# Patient Record
Sex: FEMALE | Race: WHITE | NOT HISPANIC OR LATINO | Employment: UNEMPLOYED | URBAN - METROPOLITAN AREA
[De-identification: names, ages, dates, MRNs, and addresses within clinical notes are randomized per-mention and may not be internally consistent; named-entity substitution may affect disease eponyms.]

---

## 2023-11-24 ENCOUNTER — HOSPITAL ENCOUNTER (OUTPATIENT)
Dept: RADIOLOGY | Facility: HOSPITAL | Age: 53
Discharge: HOME | End: 2023-11-24
Payer: OTHER GOVERNMENT

## 2023-11-24 ENCOUNTER — OFFICE VISIT (OUTPATIENT)
Dept: PRIMARY CARE | Facility: CLINIC | Age: 53
End: 2023-11-24
Payer: OTHER GOVERNMENT

## 2023-11-24 DIAGNOSIS — R11.2 NAUSEA AND VOMITING, UNSPECIFIED VOMITING TYPE: ICD-10-CM

## 2023-11-24 DIAGNOSIS — K59.00 CONSTIPATION, UNSPECIFIED CONSTIPATION TYPE: ICD-10-CM

## 2023-11-24 DIAGNOSIS — R14.0 ABDOMINAL BLOATING: Primary | ICD-10-CM

## 2023-11-24 DIAGNOSIS — R14.0 ABDOMINAL BLOATING: ICD-10-CM

## 2023-11-24 PROBLEM — F40.243 FEAR OF FLYING: Status: ACTIVE | Noted: 2023-11-24

## 2023-11-24 PROBLEM — N92.0 MENORRHAGIA WITH REGULAR CYCLE: Status: RESOLVED | Noted: 2023-11-24 | Resolved: 2023-11-24

## 2023-11-24 PROBLEM — E78.5 HYPERLIPIDEMIA: Status: ACTIVE | Noted: 2023-11-24

## 2023-11-24 PROBLEM — N20.0 KIDNEY STONE: Status: RESOLVED | Noted: 2023-11-24 | Resolved: 2023-11-24

## 2023-11-24 PROBLEM — F41.9 ANXIETY: Status: ACTIVE | Noted: 2023-11-24

## 2023-11-24 PROBLEM — E55.9 VITAMIN D DEFICIENCY: Status: ACTIVE | Noted: 2023-11-24

## 2023-11-24 PROBLEM — E66.9 OBESITY WITH BODY MASS INDEX 30 OR GREATER: Status: RESOLVED | Noted: 2023-11-24 | Resolved: 2023-11-24

## 2023-11-24 PROCEDURE — 99213 OFFICE O/P EST LOW 20 MIN: CPT | Mod: 25 | Performed by: NURSE PRACTITIONER

## 2023-11-24 PROCEDURE — 99213 OFFICE O/P EST LOW 20 MIN: CPT | Performed by: NURSE PRACTITIONER

## 2023-11-24 PROCEDURE — 1036F TOBACCO NON-USER: CPT | Performed by: NURSE PRACTITIONER

## 2023-11-24 PROCEDURE — 76700 US EXAM ABDOM COMPLETE: CPT

## 2023-11-24 RX ORDER — MEDROXYPROGESTERONE ACETATE 5 MG/1
5 TABLET ORAL EVERY 24 HOURS
COMMUNITY

## 2023-11-24 RX ORDER — ESTRADIOL 1 MG/1
1 TABLET ORAL DAILY
COMMUNITY
Start: 2023-10-21

## 2023-11-24 RX ORDER — SERTRALINE HYDROCHLORIDE 50 MG/1
50 TABLET, FILM COATED ORAL DAILY
COMMUNITY
Start: 2019-04-11 | End: 2024-04-10

## 2023-11-24 RX ORDER — ERGOCALCIFEROL 1.25 MG/1
50000 CAPSULE ORAL
COMMUNITY
End: 2023-12-11

## 2023-11-24 RX ORDER — ATORVASTATIN CALCIUM 40 MG/1
40 TABLET, FILM COATED ORAL DAILY
COMMUNITY
Start: 2023-10-20 | End: 2024-04-18

## 2023-11-24 ASSESSMENT — PAIN SCALES - GENERAL: PAINLEVEL: 0-NO PAIN

## 2023-11-24 ASSESSMENT — PATIENT HEALTH QUESTIONNAIRE - PHQ9
1. LITTLE INTEREST OR PLEASURE IN DOING THINGS: NOT AT ALL
2. FEELING DOWN, DEPRESSED OR HOPELESS: NOT AT ALL
SUM OF ALL RESPONSES TO PHQ9 QUESTIONS 1 AND 2: 0

## 2023-11-24 NOTE — PROGRESS NOTES
Subjective   Patient ID: Lexi Salazar is a 53 y.o. female who presents for Abdominal Pain (Patient here for stomach pain).  Bloating and nausea since yesterday had mild conspiation, and nauseated and vomiting, pepto no relief no urinary symptoms in mepause   HPI     Review of Systems   Constitutional:  Positive for fatigue.   HENT: Negative.     Respiratory: Negative.     Cardiovascular: Negative.    Gastrointestinal:  Positive for abdominal distention, abdominal pain and constipation.   Genitourinary: Negative.    Musculoskeletal: Negative.    Neurological: Negative.    All other systems reviewed and are negative.      Objective   There were no vitals taken for this visit.    Physical Exam  Vitals reviewed.   Constitutional:       General: She is not in acute distress.     Appearance: Normal appearance.   Cardiovascular:      Rate and Rhythm: Normal rate and regular rhythm.      Heart sounds: No murmur heard.  Pulmonary:      Breath sounds: Normal breath sounds. No wheezing.   Abdominal:      General: Bowel sounds are normal. There is distension.      Palpations: Abdomen is soft.   Neurological:      Mental Status: She is alert.         Assessment/Plan   Problem List Items Addressed This Visit    None  Visit Diagnoses         Codes    Abdominal bloating    -  Primary R14.0    Relevant Orders    US abdomen (Completed)    Nausea and vomiting, unspecified vomiting type     R11.2    Relevant Orders    US abdomen (Completed)    Constipation, unspecified constipation type     K59.00    Relevant Orders    US abdomen (Completed)

## 2023-11-26 ASSESSMENT — ENCOUNTER SYMPTOMS
ABDOMINAL PAIN: 1
CARDIOVASCULAR NEGATIVE: 1
CONSTIPATION: 1
RESPIRATORY NEGATIVE: 1
MUSCULOSKELETAL NEGATIVE: 1
FATIGUE: 1
ABDOMINAL DISTENTION: 1
NEUROLOGICAL NEGATIVE: 1

## 2023-12-10 DIAGNOSIS — E55.9 VITAMIN D DEFICIENCY: ICD-10-CM

## 2023-12-11 RX ORDER — ERGOCALCIFEROL 1.25 MG/1
50000 CAPSULE ORAL
Qty: 12 CAPSULE | Refills: 3 | Status: SHIPPED | OUTPATIENT
Start: 2023-12-11 | End: 2024-11-11

## 2024-03-07 ENCOUNTER — OFFICE VISIT (OUTPATIENT)
Dept: ORTHOPEDIC SURGERY | Facility: CLINIC | Age: 54
End: 2024-03-07
Payer: OTHER GOVERNMENT

## 2024-03-07 DIAGNOSIS — M65.30 TRIGGER FINGER, ACQUIRED: Primary | ICD-10-CM

## 2024-03-07 PROCEDURE — 1036F TOBACCO NON-USER: CPT | Performed by: ORTHOPAEDIC SURGERY

## 2024-03-07 PROCEDURE — 99214 OFFICE O/P EST MOD 30 MIN: CPT | Performed by: ORTHOPAEDIC SURGERY

## 2024-03-07 ASSESSMENT — ENCOUNTER SYMPTOMS
LOSS OF SENSATION IN FEET: 0
DEPRESSION: 0
OCCASIONAL FEELINGS OF UNSTEADINESS: 0

## 2024-03-07 ASSESSMENT — PATIENT HEALTH QUESTIONNAIRE - PHQ9
SUM OF ALL RESPONSES TO PHQ9 QUESTIONS 1 AND 2: 0
1. LITTLE INTEREST OR PLEASURE IN DOING THINGS: NOT AT ALL
2. FEELING DOWN, DEPRESSED OR HOPELESS: NOT AT ALL

## 2024-03-07 ASSESSMENT — PAIN - FUNCTIONAL ASSESSMENT: PAIN_FUNCTIONAL_ASSESSMENT: NO/DENIES PAIN

## 2024-03-07 NOTE — PROGRESS NOTES
History of Present Illness:  Chief complaint: Right middle and ring finger trigger fingers      Patient presents today for evaluation of right long finger, ring finger pain. Endorses catching of the digit with flexion. Denies any traumatic event or injury. The patient notes that it is worse with periods of disuse and in the morning.  Somewhat better with rest.  The patient endorses sharp focal pain when it catches.  The patient denies any numbness and tingling.  Symptoms were improved for about 6 months after corticosteroid injections.  Over the past month increasing pain.  No issues on the left side currently.    Past Medical History:   Diagnosis Date    Calculus of kidney     Bilateral kidney stones    Carpal tunnel syndrome, bilateral upper limbs 2016    Severe carpal tunnel syndrome of both wrists    Carpal tunnel syndrome, left upper limb 2016    Carpal tunnel syndrome of left wrist    Carpal tunnel syndrome, left upper limb 2016    Carpal tunnel syndrome of left wrist    Encounter for full-term uncomplicated delivery      (spontaneous vaginal delivery)    Kidney stone 2023    Menorrhagia with regular cycle 2023    Obesity with body mass index 30 or greater 2023    Personal history of other endocrine, nutritional and metabolic disease 08/10/2015    History of obesity    Personal history of other mental and behavioral disorders 2015    History of depression    Personal history of other specified conditions 2017    History of motion sickness    Personal history of other specified conditions 2016    History of palpitations    Varicella without complication     Varicella without complication       Medication Documentation Review Audit       Reviewed by Bismark Gonzalez MD (Physician) on 24 at 1352      Medication Order Taking? Sig Documenting Provider Last Dose Status   atorvastatin (Lipitor) 40 mg tablet [id number] No Take 1 tablet (40 mg) by mouth  once daily. Historical Provider, MD Taking Active   ergocalciferol (Vitamin D2) 1.25 MG (85083 UT) capsule [id number]  Take 1 capsule (50,000 Units) by mouth 1 (one) time per week. Griselda Curry, APRN-CNP  Active   estradiol (Estrace) 1 mg tablet [id number] No Take 1 tablet (1 mg) by mouth once daily. Historical Provider, MD Taking Active   medroxyPROGESTERone (Provera) 5 mg tablet [id number] No Take 1 tablet (5 mg) by mouth once every 24 hours. Historical Provider, MD Taking Active   Zoloft 50 mg tablet [id number] No Take 1 tablet (50 mg) by mouth once daily. Historical Provider, MD Taking Active                    No Known Allergies    Social History     Socioeconomic History    Marital status:      Spouse name: Not on file    Number of children: Not on file    Years of education: Not on file    Highest education level: Not on file   Occupational History    Not on file   Tobacco Use    Smoking status: Never    Smokeless tobacco: Never   Substance and Sexual Activity    Alcohol use: Not Currently    Drug use: Never    Sexual activity: Not on file   Other Topics Concern    Not on file   Social History Narrative    Not on file     Social Determinants of Health     Financial Resource Strain: Not on file   Food Insecurity: Not on file   Transportation Needs: Not on file   Physical Activity: Not on file   Stress: Not on file   Social Connections: Not on file   Intimate Partner Violence: Not on file   Housing Stability: Not on file       Past Surgical History:   Procedure Laterality Date    MOUTH SURGERY  11/16/2017    Oral Surgery Tooth Extraction    OTHER SURGICAL HISTORY  11/16/2017    Cardiac Catheter His Ablation          Review of Systems   GENERAL: Negative for malaise, significant weight loss, fever  MUSCULOSKELETAL: see HPI  NEURO:  Negative     Physical Examination  Constitutional: Appears well-developed and well-nourished.  Head: Normocephalic and atraumatic.  Eyes: EOMI grossly  Cardiovascular: Intact  distal pulses.   Respiratory: Effort normal. No respiratory distress.  Neurologic: Alert and oriented to person, place, and time.  Skin: Skin is warm and dry.  Hematologic / Lymphatic: No lymphedema, lymphangitis.  Psychiatric: normal mood and affect. Behavior is normal.   Musculoskeletal:  right hand  No obvious atrophy, no skin changes  Tenderness, palpable nodule along the long finger, ring finger flexor tendon sheath  Palpable catching/crepitus with active flexion and extension   No subluxation of the extensor tendon appreciated over the MP/PIP joints.  Sensation intact distally.  Capillary refill less than 2 seconds distally.     Assessment:  Patient with right long finger, ring finger trigger finger.  Failure of previous corticosteroid injection.     Plan:  Nature of the diagnosis was discussed with the patient.  We also discussed the risks and benefits of treatment options for trigger finger.  These include splinting, corticosteroid injections and, if conservative options fail, surgical release.  As patient has failed previous corticosteroid injection she would like to proceed with surgical intervention.    Risks and benefits of continued nonoperative versus operative treatment options were reviewed.  The surgical benefits and the potential complications were reviewed with the patient today, as well as the day surgical setting and the likely postoperative course.  Patient understands that the goal of surgery is potential relief of some symptoms.  Risks discussed included, but were not limited to, residual/recurrent/worsening symptoms, pain, infection, bleeding, stiffness, injury to nerve/blood vessels/tendons, wound healing issues and possible need for reoperation.  I answered the patient's questions and surgical consent reviewed and obtained.  The patient has elected to proceed with surgery and we will schedule it at the patient's convenience.    The patient will followup with us in the operating room and  then postoperatively.

## 2024-03-08 PROBLEM — M65.30 TRIGGER FINGER, ACQUIRED: Status: ACTIVE | Noted: 2024-03-07

## 2024-03-11 ENCOUNTER — HOSPITAL ENCOUNTER (OUTPATIENT)
Dept: RADIOLOGY | Facility: CLINIC | Age: 54
Discharge: HOME | End: 2024-03-11
Payer: OTHER GOVERNMENT

## 2024-03-11 ENCOUNTER — OFFICE VISIT (OUTPATIENT)
Dept: ORTHOPEDIC SURGERY | Facility: CLINIC | Age: 54
End: 2024-03-11
Payer: OTHER GOVERNMENT

## 2024-03-11 DIAGNOSIS — M25.561 ACUTE PAIN OF RIGHT KNEE: ICD-10-CM

## 2024-03-11 PROCEDURE — 99214 OFFICE O/P EST MOD 30 MIN: CPT

## 2024-03-11 PROCEDURE — 73564 X-RAY EXAM KNEE 4 OR MORE: CPT | Mod: RT

## 2024-03-11 PROCEDURE — 1036F TOBACCO NON-USER: CPT

## 2024-03-11 RX ORDER — MELOXICAM 15 MG/1
15 TABLET ORAL DAILY
Qty: 14 TABLET | Refills: 0 | Status: SHIPPED | OUTPATIENT
Start: 2024-03-11 | End: 2024-03-25

## 2024-03-11 RX ORDER — MELOXICAM 15 MG/1
15 TABLET ORAL DAILY
Qty: 14 TABLET | Refills: 0 | Status: CANCELLED | OUTPATIENT
Start: 2024-03-11 | End: 2024-03-25

## 2024-03-11 NOTE — PROGRESS NOTES
Lexi Salazar  is a 53 y.o. year-old  female. she  is a new patient to our office and presents with a chief complaint of Right  knee pain. Pain has been insidious in nature over the past month. Pains location is lateral and patient only notices pain if she put direct pressure on knee when kneeling on ground. Describes pain as burning/ numb/tingling. Denies injury, swelling, giving way of the knee, locking/catching, night pain, low back pain, prior injury or prior surgery to the knee. Treatment to date include activity modification/NSAIDS/rest/hx of IA steroid injection 3-4 years ago without adequate relief.       Past Medical, Family, and Social History reviewed   Review of Systems  A complete review of systems was conducted, pertinent only to the HPI noted above.  Constitutional: None  Eyes: No additions to above history  Ears, Nose, Throat: No additions to above history  Cardiovascular: No additions to above history  Respiratory: No additions to above history  GI: No additions to above history  : No additions to above history  Skin/Neuro: No additions to above history  Endocrine/Heme/Lymph: No additions to above history  Immunologic: No additions to above history  Psychiatric: No additions to above history  Musculoskeletal: see above    GEN: Alert and Oriented x 3  Constitutional: Well appearing , in no apparent distress.  Skin: No rashes, erythema, or induration around knee    MUSCULOSKELETAL EXAM:     Right KNEE:  ROM:  [0]/ [0] / 125  Effusion: -  Alignment: [neutral]      Gait: [normal]  Sensation intact bilaterally sural/saphenous/sp/dp/tibial nerve bilaterally  Motor 5/5 knee flexion/extension/foot DF/PF/EHL/FHL bilaterally  Palpable/symmetric DP and PT pulse bilaterally      PATELLAR/EXTENSOR MECHANISM:   KNEE:  Patellar Crepitus: n  Patellar Compression Pain:n  Patellar Apprehension: [no]  Extensor Mechanism: [intact]  Straight Leg Raise: good      LIGAMENTS:  ACL: Lachmans: [negative]  PCL:  [stable]  Valgus at 0 degrees: [stable]  Valgus at 30 degrees: [stable]  Varus at 0 degrees: [stable]  Varus at 30 degrees: [stable]    MENISCUS EXAM:  Joint Line Tenderness:none   McMurrays: [negative]  Pain with Deep Flexion: [No]    Xrays independently viewed and interpreted: very mild tricompartmental DJD, most prominent in the patellofemoral compartment. No fracture or dislocation    Discussed with the patient the unusual pattern to her pain and symptoms. There may be a component of IT band but given her description of symptoms, these do sound nerve related. No mechanical symptoms. Recommendations at this point a short course of Meloxicam and PT. I did recommend she utilize knee pads when kneeling as this is when her pain is elicited. We will see her back in 4-6 weeks for follow up, at which time if her symptoms are continuing, an IA steroid injection versus a possible consult with one of our spine med physicians would be recommended.

## 2024-03-21 PROBLEM — M25.561 ACUTE PAIN OF RIGHT KNEE: Status: ACTIVE | Noted: 2024-03-21

## 2024-03-21 NOTE — PROGRESS NOTES
Physical Therapy Evaluation/Treatment    Patient Name: Lexi Salazar  MRN: [id number]  Encounter Date: 4/3/2024  Time Calculation  Start Time: 1030  Stop Time: 1118  Time Calculation (min): 48 min    Visit Number:  1/8 (including evaluation)  Planned total visits: 8  Visit Authorized/insurance considerations:  36.00 CO PAY, 100% [place], [place], [place] AUTH, PER [facility] S[facility]   Progress Report due visit #8    Current Problem:  1. Acute pain of right knee  Referral to Physical Therapy    Follow Up In Physical Therapy          Subjective:  Subjective     SUBJECTIVE:  Main complaint: pain when she kneels on her right knee so she avoids this position.  Pt is concerned because outdoor chores will begin and she wants to be able to kneel comfortably; her knee pain starts laterally and radiates inward   Onset Date:   insidious onset  Date of Injury:  NA (03/11/2024)    Patient reported hx of injury:   Pt states she kneeled down one day and felt burning on the outside of her right knee. Pain was severe and she's been avoiding kneeling    Previous Medical Treatment:    Anti-inflammatory but did not help.   H/o plantar fasciitis    Diagnostics:  X Ray    XR knee right 4+ views    Result Date: 3/13/2024  Interpreted By:  Jonah Brar, STUDY: XR KNEE RIGHT 4+ VIEWS; 3/11/2024 4:01 pm   INDICATION: Signs/Symptoms:right knee pain.   COMPARISON: None.   ACCESSION NUMBER(S): [id number]   ORDERING CLINICIAN: BRINA JIMENEZ   TECHNIQUE: AP, lateral, tunnel, and sunrise views       FINDINGS: No fractures or destructive lesions are identified. There is minimal spurring at the quadriceps tendon insertion upon the anterior superior patellar pole. There is a minimal spurring at the posterosuperior patellar margin. There is no evidence for an effusion. Joint spaces are maintained. There is no evidence for chondrocalcinosis.       No acute pathologic findings are identified. Minimal patellofemoral osteoarthritis.   MACRO: none   Signed by:  Jonah Brar 3/13/2024 8:48 AM Dictation workstation:   [id number]      Previous Therapy Treatments:    N/A    Pt stated Goal:  Wants the pain to go away and be able to kneel for daily activities and gardening.      General:       Precautions:  Precautions  Precautions Comment: Trigger release right hand 04/12/2024    Pain:  Pain Assessment: 0-10  Pain Score: 0 - No pain (10/10 when kneeling, resolves in a couple minutes)  Pain Type: Acute pain  Pain Location: Knee  Pain Orientation: Right  Pain Radiating Towards: begins laterallu and moves toward patellatar tendon  Pain Descriptors: Burning  Pain Frequency: Intermittent  Pain Onset: Ongoing  Clinical Progression: Not changed    Home Living:  Home Living Comment: yes    Home type: House  Stairs: Yes  Lives with: Spouse and son    Vocation:    Other   Job/Job tasks:  Not working, former     Prior Function Per Pt/Caregiver Report:  Level of [place]: Independent with ADLs and functional transfers, Independent with homemaking with ambulation    OBJECTIVE:    Posture:  Posture Comment: Forward head, anterior shoulders, rounded back    Posture:     ROM and Strength:  AROM:  WNL       Strength   Hip R L   Hip Flexion 4+/5 5/5   Hip Abduction 4+/5 5/5   Hip Adduction 5/5 5/5        AROM STRENGTH   Knee R L R L   Knee Flexion WFL WFL 5/5 5/5   Knee Extension Hyper extends Hyperextends 4+/5 5/5     Ankle:  WNL    Flexibility:       R  L   Pectoralis tight tight   Piriformis tight tight   Iliotibial band tight tight   Hamstring tight tight        Special Tests:     Hip   Right Left   Patellar tracking postive positive   Piriformis negative negative       Palpation:  Palpation Comment: No tenderness noted this date    Gait:  Gait Comment: slightly    Transfers:  Transfers Comment: Increased flexion with UE A    Outcome Measures:  Other Measures  Other Outcome Measures: WOMAC 4%     Assessment       Pt is a 54 y.o. female who presents with impairments of  right knee due to pain when she kneels.  Pt would benefit from skilled physical therapy to improve flexibility, ROM, strength to reduce pain and improve the patient's current level of functioning including routine exercise program.     Plan  Treatment/Interventions: Aquatic therapy, Dry needling, Education/ Instruction, Electrical stimulation, Gait training, Manual therapy, Neuromuscular re-education, Self care/ home management, Taping techniques, Therapeutic activities, Therapeutic exercises  PT Plan: Skilled PT  PT Frequency: 2 times per week (begin with 1x per week)  Duration: 12 weeks      Goals:  Active       PT Problem - right knee       PT Goal 1 - STG       Start:  04/03/24    Expected End:  05/18/24       1.  Improve bilateral hamstring, ITB, piriformis length to 75% of WNL  2.  Pain:  0 to 5              PT Goal 2 - LTG , functional goal       Start:  04/03/24    Expected End:  07/02/24       LONG TERM GOALS  1.  Improve strength of right LE to 5/5  2.  Improve bilateral hamstring, ITB, piriformis length to 90% of WNL  3.  Pain:  0 to 2  4.  Functional Outcome Measure: WOMAC (0%)    FUNCTIONAL GOALS  Pt able to kneel 75% of the time with very minimal to no right knee pain for gardening and day to day IADLs         Patient Stated Goal 1       Start:  04/03/24    Expected End:  07/02/24       Wants the pain to go away and be able to kneel for daily activities and gardening.             Treatments this date:       H/L cross over piriformis stretch R/L  ITB stretch  L/S HS stretch    Manual: right patellar mobs    Perform HEP as instructed and according to handouts.  Monitor pain levels, stop any exercises that increases pain level and report to therapist next visit.      Billed Treatment Times:  Therapeutic Exercise 5 min    Therapeutic Activities:  OP EDUCATION:     Reviewed anatomy of the knee with patient relative to her right knee issues at the time (patellar alignment, lateral mm tightness, VMO weakness,  patellar tracking) and rationale for PT POC to address these issues.    Billed Treatment Times:  Therapeutic Activity 9 min    Plan for next visit:  LE strengthening, stretches, manual as needed

## 2024-03-26 ENCOUNTER — APPOINTMENT (OUTPATIENT)
Dept: ORTHOPEDIC SURGERY | Facility: CLINIC | Age: 54
End: 2024-03-26
Payer: OTHER GOVERNMENT

## 2024-04-02 ENCOUNTER — APPOINTMENT (OUTPATIENT)
Dept: PHYSICAL THERAPY | Facility: CLINIC | Age: 54
End: 2024-04-02
Payer: OTHER GOVERNMENT

## 2024-04-03 ENCOUNTER — EVALUATION (OUTPATIENT)
Dept: PHYSICAL THERAPY | Facility: CLINIC | Age: 54
End: 2024-04-03
Payer: OTHER GOVERNMENT

## 2024-04-03 DIAGNOSIS — M25.561 ACUTE PAIN OF RIGHT KNEE: Primary | ICD-10-CM

## 2024-04-03 PROCEDURE — 97161 PT EVAL LOW COMPLEX 20 MIN: CPT | Mod: GP | Performed by: PHYSICAL THERAPIST

## 2024-04-03 PROCEDURE — 97530 THERAPEUTIC ACTIVITIES: CPT | Mod: GP | Performed by: PHYSICAL THERAPIST

## 2024-04-03 ASSESSMENT — PAIN SCALES - GENERAL: PAINLEVEL_OUTOF10: 0 - NO PAIN

## 2024-04-03 ASSESSMENT — PAIN - FUNCTIONAL ASSESSMENT: PAIN_FUNCTIONAL_ASSESSMENT: 0-10

## 2024-04-03 ASSESSMENT — PAIN DESCRIPTION - DESCRIPTORS: DESCRIPTORS: BURNING

## 2024-04-05 NOTE — PROGRESS NOTES
Physical Therapy Treatment    Patient Name: Lexi Salazar  MRN: [id number]  Encounter date:  4/9/2024  Time Calculation  Start Time:   Stop Time:   Time Calculation (min):  min  PT Modalities Time Entry  Ultrasound Time Entry:   PT Therapeutic Procedures Time Entry  Manual Therapy Time Entry:     Visit Number:  Visit Number:  /8 (including evaluation)  Planned total visits: 8  Visit Authorized/insurance considerations:  36.00 CO PAY, [place], [place], PER [facility]  (including evaluation)    Current Problem  No diagnosis found.    Precautions  Trigger release right hand 04/12/2024     Pain      Subjective  General    Objective    Treatment:    Manual:     Therapeutic exercise:  On plinth:      H/L cross over piriformis stretch R/L  ITB stretch  L/S HS stretch    Quad sets x 10  SLR x 10 ea  S/L hip abduction x 10 ea  H/L adduction x 10   H/L abduction x 10   SAQ x 10 over bolster ea    Seated:   Marches x 10 ea    LAQ x 10 ea    Standing:  3 way hip x 10 ea     Current HEP:    Activity tolerance:    OP EDUCATION:    Assessment:    Pt's response to treatment:  ***  Areas of improvements:  ***  Limitations/deficits:  ***    Pain end of session: ***    Plan:    {BASPLAN:95925}    Assessment of current progress against goals:  {BASPTNOTEGOALASSESSMENT:49139}    Goals:    Active         PT Problem - right knee         PT Goal 1 - STG         Start:  04/03/24    Expected End:  05/18/24        1.  Improve bilateral hamstring, ITB, piriformis length to 75% of WNL  2.  Pain:  0 to 5                 PT Goal 2 - LTG , functional goal         Start:  04/03/24    Expected End:  07/02/24        LONG TERM GOALS  1.  Improve strength of right LE to 5/5  2.  Improve bilateral hamstring, ITB, piriformis length to 90% of WNL  3.  Pain:  0 to 2  4.  Functional Outcome Measure: WOMAC (0%)     FUNCTIONAL GOALS  Pt able to kneel 75% of the time with very minimal to no right knee pain for gardening and day to day IADLs            Patient Stated Goal 1         Start:  04/03/24    Expected End:  07/02/24        Wants the pain to go away and be able to kneel for daily activities and gardening.

## 2024-04-09 ENCOUNTER — APPOINTMENT (OUTPATIENT)
Dept: PHYSICAL THERAPY | Facility: CLINIC | Age: 54
End: 2024-04-09
Payer: OTHER GOVERNMENT

## 2024-04-09 DIAGNOSIS — F41.9 ANXIETY: ICD-10-CM

## 2024-04-10 RX ORDER — SERTRALINE HYDROCHLORIDE 50 MG/1
50 TABLET, FILM COATED ORAL DAILY
Qty: 90 TABLET | Refills: 3 | Status: SHIPPED | OUTPATIENT
Start: 2024-04-10

## 2024-04-11 ENCOUNTER — APPOINTMENT (OUTPATIENT)
Dept: PHYSICAL THERAPY | Facility: CLINIC | Age: 54
End: 2024-04-11
Payer: OTHER GOVERNMENT

## 2024-04-12 ENCOUNTER — HOSPITAL ENCOUNTER (OUTPATIENT)
Facility: CLINIC | Age: 54
Setting detail: OUTPATIENT SURGERY
Discharge: HOME | End: 2024-04-12
Attending: ORTHOPAEDIC SURGERY | Admitting: ORTHOPAEDIC SURGERY
Payer: OTHER GOVERNMENT

## 2024-04-12 ENCOUNTER — TELEPHONE (OUTPATIENT)
Dept: ORTHOPEDIC SURGERY | Facility: CLINIC | Age: 54
End: 2024-04-12

## 2024-04-12 VITALS
TEMPERATURE: 98.2 F | WEIGHT: 211.42 LBS | OXYGEN SATURATION: 97 % | SYSTOLIC BLOOD PRESSURE: 131 MMHG | BODY MASS INDEX: 39.92 KG/M2 | DIASTOLIC BLOOD PRESSURE: 61 MMHG | RESPIRATION RATE: 16 BRPM | HEART RATE: 66 BPM | HEIGHT: 61 IN

## 2024-04-12 DIAGNOSIS — M65.30 TRIGGER FINGER, ACQUIRED: Primary | ICD-10-CM

## 2024-04-12 PROCEDURE — 2500000005 HC RX 250 GENERAL PHARMACY W/O HCPCS: Performed by: ORTHOPAEDIC SURGERY

## 2024-04-12 PROCEDURE — 2500000004 HC RX 250 GENERAL PHARMACY W/ HCPCS (ALT 636 FOR OP/ED): Performed by: ORTHOPAEDIC SURGERY

## 2024-04-12 PROCEDURE — 3600000008 HC OR TIME - EACH INCREMENTAL 1 MINUTE - PROCEDURE LEVEL THREE: Performed by: ORTHOPAEDIC SURGERY

## 2024-04-12 PROCEDURE — A4217 STERILE WATER/SALINE, 500 ML: HCPCS | Performed by: ORTHOPAEDIC SURGERY

## 2024-04-12 PROCEDURE — 7100000010 HC PHASE TWO TIME - EACH INCREMENTAL 1 MINUTE: Performed by: ORTHOPAEDIC SURGERY

## 2024-04-12 PROCEDURE — 3600000003 HC OR TIME - INITIAL BASE CHARGE - PROCEDURE LEVEL THREE: Performed by: ORTHOPAEDIC SURGERY

## 2024-04-12 PROCEDURE — 7100000009 HC PHASE TWO TIME - INITIAL BASE CHARGE: Performed by: ORTHOPAEDIC SURGERY

## 2024-04-12 PROCEDURE — 26055 INCISE FINGER TENDON SHEATH: CPT | Performed by: ORTHOPAEDIC SURGERY

## 2024-04-12 RX ORDER — LIDOCAINE HYDROCHLORIDE AND EPINEPHRINE 10; 10 MG/ML; UG/ML
INJECTION, SOLUTION INFILTRATION; PERINEURAL AS NEEDED
Status: DISCONTINUED | OUTPATIENT
Start: 2024-04-12 | End: 2024-04-12 | Stop reason: HOSPADM

## 2024-04-12 RX ORDER — SODIUM CHLORIDE 0.9 G/100ML
IRRIGANT IRRIGATION AS NEEDED
Status: DISCONTINUED | OUTPATIENT
Start: 2024-04-12 | End: 2024-04-12 | Stop reason: HOSPADM

## 2024-04-12 ASSESSMENT — PAIN SCALES - GENERAL
PAINLEVEL_OUTOF10: 0 - NO PAIN
PAINLEVEL_OUTOF10: 0 - NO PAIN

## 2024-04-12 ASSESSMENT — PAIN - FUNCTIONAL ASSESSMENT
PAIN_FUNCTIONAL_ASSESSMENT: 0-10
PAIN_FUNCTIONAL_ASSESSMENT: 0-10

## 2024-04-12 ASSESSMENT — COLUMBIA-SUICIDE SEVERITY RATING SCALE - C-SSRS
6. HAVE YOU EVER DONE ANYTHING, STARTED TO DO ANYTHING, OR PREPARED TO DO ANYTHING TO END YOUR LIFE?: NO
1. IN THE PAST MONTH, HAVE YOU WISHED YOU WERE DEAD OR WISHED YOU COULD GO TO SLEEP AND NOT WAKE UP?: NO
2. HAVE YOU ACTUALLY HAD ANY THOUGHTS OF KILLING YOURSELF?: NO

## 2024-04-12 NOTE — TELEPHONE ENCOUNTER
4/12/24 rt m, rt r finger trigger release  Patient had surgery today, she was suppose to have pain medication sent to pharmacy but they do not have anything on file.  Pharmacy: [facility] [address] [place], [place]

## 2024-04-12 NOTE — OP NOTE
Pre-Operative Diagnosis: Right middle and ring finger trigger fingers  Post-Operative Diagnosis: same  Procedure: Right middle and ring finger trigger releases  Assistant: None  Anesthesia: Local   Complications: none  Estimated blood loss: minimal  Specimen: none  Findings: See below  Disposition: Good/PACU      Indications: Patient with symptomatic trigger fingers. We discussed risks and benefits of continued nonoperative versus operative treatment options and after thoroughly reviewing patient wished to proceed with operative intervention, as indicated. Expected operative and postoperative courses reviewed and all questions addressed.    Operative course: The patient was greeted in the preoperative holding area and the operative site was marked with indelible marker.  After confirming patient identifiers and surgical site a mixture of lidocaine and Marcaine with epinephrine was infiltrated about the planned incision site using sterile technique.  Patient was brought back to the operating room suite and the operative extremity was prepped and draped in standard sterile fashion and timeout procedure was performed as per standard protocol.  A transverse incision was made directly overlying the level of the right middle and ring finger A1 pulleys. Gentle spreading was carried down to the level of the middle and ring finger A1 pulleys.  Neurovascular structures were protected with gentle blunt retraction and A1 pulleys were sharply released in a proximal to distal direction. Complete release distally was confirmed visually. Gentle spreading proximally also confirmed complete release of both the middle and ring finger trigger fingers. Patient was then able to demonstrate full composite flexion and extension with resolution of locking/catching. Wound was irrigated and reapproximated with 4-0 Monocryl suture in a buried interrupted fashion followed by Exofin on the skin.  Dry sterile dressings were applied and patient  was taken to the recovery room for further care.    Patient will follow-up in 2 weeks for wound check.

## 2024-04-12 NOTE — DISCHARGE INSTRUCTIONS
Dr. Gonzalez Post-Operative Instructions  Hand/Wrist/Elbow    Activity:  Rest on the day of surgery.  Gradually resume your regular diet.    Anesthesia:  Numbing medication may last for 8-24 hours.    Post-Operative Medications:  You may resume your regular medications (including blood thinners).  You have been given a prescription for pain medication as needed.  You may also take over-the-counter anti-inflammatories and/or Tylenol for pain relief.  If you are taking the prescribed pain medication you must limit additional Tylenol (acetaminophen) intake to avoid overdose.    Dressings:  Keep your dressings clean and dry.  You may cover with a plastic bag or cast cover and seal bag to your skin above the bandage for showering.  If your dressing becomes wet or significantly bloodstained call the office at [phone].  Okay to remove outer dressings after 2-3 days and shower/wash hands.  Do not soak wound (I.e. no swimming pool, hot tub or dishes).    Post-Operative Care:  Keep the surgical site elevated above the level of your heart to limit swelling.  If your fingers are not included in the dressing you are encouraged to move your fingers regularly (full fist and full extension).  Regularly ice the surgical site.  You may also apply ice pack above the level of the dressing and this will cool the blood as it travels towards the surgical site.    Call Surgeon/Office at any time for:           Office Number: [phone]  Excessive bleeding  Loss of feeling (The local numbing medicine from surgery typically lasts 8-12 hours.  Nerve blocks can last 18-36 hours.)  Tight dressing: Make sure that you are elevating the operative site appropriately.  If no relief then call the office.                                                                                                        Circulation issues:  If fingers change to white or blue  Concerns/Problems with your surgery

## 2024-04-12 NOTE — H&P
History of Present Illness:  Chief complaint: Right middle and ring finger trigger fingers        Patient presents today for evaluation of right long finger, ring finger pain. Endorses catching of the digit with flexion. Denies any traumatic event or injury. The patient notes that it is worse with periods of disuse and in the morning.  Somewhat better with rest.  The patient endorses sharp focal pain when it catches.  The patient denies any numbness and tingling.  Symptoms were improved for about 6 months after corticosteroid injections.  Over the past month increasing pain.  No issues on the left side currently.     Medical History        Past Medical History:   Diagnosis Date    Calculus of kidney       Bilateral kidney stones    Carpal tunnel syndrome, bilateral upper limbs 2016     Severe carpal tunnel syndrome of both wrists    Carpal tunnel syndrome, left upper limb 2016     Carpal tunnel syndrome of left wrist    Carpal tunnel syndrome, left upper limb 2016     Carpal tunnel syndrome of left wrist    Encounter for full-term uncomplicated delivery        (spontaneous vaginal delivery)    Kidney stone 2023    Menorrhagia with regular cycle 2023    Obesity with body mass index 30 or greater 2023    Personal history of other endocrine, nutritional and metabolic disease 08/10/2015     History of obesity    Personal history of other mental and behavioral disorders 2015     History of depression    Personal history of other specified conditions 2017     History of motion sickness    Personal history of other specified conditions 2016     History of palpitations    Varicella without complication       Varicella without complication            Medication Documentation Review Audit         Reviewed by Bismark Gonzalez MD (Physician) on 24 at 1352       Medication Order Taking? Sig Documenting Provider Last Dose Status   atorvastatin (Lipitor) 40 mg  tablet [id number] No Take 1 tablet (40 mg) by mouth once daily. Historical Provider, MD Taking Active   ergocalciferol (Vitamin D2) 1.25 MG (37940 UT) capsule [id number]   Take 1 capsule (50,000 Units) by mouth 1 (one) time per week. Griselda Curry, APRN-CNP   Active   estradiol (Estrace) 1 mg tablet [id number] No Take 1 tablet (1 mg) by mouth once daily. Historical Provider, MD Taking Active   medroxyPROGESTERone (Provera) 5 mg tablet [id number] No Take 1 tablet (5 mg) by mouth once every 24 hours. Historical Provider, MD Taking Active   Zoloft 50 mg tablet [id number] No Take 1 tablet (50 mg) by mouth once daily. Historical Provider, MD Taking Active                          No Known Allergies     Social History               Socioeconomic History    Marital status:        Spouse name: Not on file    Number of children: Not on file    Years of education: Not on file    Highest education level: Not on file   Occupational History    Not on file   Tobacco Use    Smoking status: Never    Smokeless tobacco: Never   Substance and Sexual Activity    Alcohol use: Not Currently    Drug use: Never    Sexual activity: Not on file   Other Topics Concern    Not on file   Social History Narrative    Not on file      Social Determinants of Health      Financial Resource Strain: Not on file   Food Insecurity: Not on file   Transportation Needs: Not on file   Physical Activity: Not on file   Stress: Not on file   Social Connections: Not on file   Intimate Partner Violence: Not on file   Housing Stability: Not on file            Surgical History         Past Surgical History:   Procedure Laterality Date    MOUTH SURGERY   11/16/2017     Oral Surgery Tooth Extraction    OTHER SURGICAL HISTORY   11/16/2017     Cardiac Catheter His Ablation               Review of Systems   GENERAL: Negative for malaise, significant weight loss, fever  MUSCULOSKELETAL: see HPI  NEURO:  Negative     Physical Examination  Constitutional: Appears  well-developed and well-nourished.  Head: Normocephalic and atraumatic.  Eyes: EOMI grossly  Cardiovascular: Intact distal pulses.   Respiratory: Effort normal. No respiratory distress.  Neurologic: Alert and oriented to person, place, and time.  Skin: Skin is warm and dry.  Hematologic / Lymphatic: No lymphedema, lymphangitis.  Psychiatric: normal mood and affect. Behavior is normal.   Musculoskeletal:  right hand  No obvious atrophy, no skin changes  Tenderness, palpable nodule along the long finger, ring finger flexor tendon sheath  Palpable catching/crepitus with active flexion and extension   No subluxation of the extensor tendon appreciated over the MP/PIP joints.  Sensation intact distally.  Capillary refill less than 2 seconds distally.     Assessment:  Patient with right long finger, ring finger trigger finger.  Failure of previous corticosteroid injection.     Plan:  Nature of the diagnosis was discussed with the patient.  We also discussed the risks and benefits of treatment options for trigger finger.  These include splinting, corticosteroid injections and, if conservative options fail, surgical release.  As patient has failed previous corticosteroid injection she would like to proceed with surgical intervention.     Risks and benefits of continued nonoperative versus operative treatment options were reviewed.  The surgical benefits and the potential complications were reviewed with the patient today, as well as the day surgical setting and the likely postoperative course.  Patient understands that the goal of surgery is potential relief of some symptoms.  Risks discussed included, but were not limited to, residual/recurrent/worsening symptoms, pain, infection, bleeding, stiffness, injury to nerve/blood vessels/tendons, wound healing issues and possible need for reoperation.

## 2024-04-16 ENCOUNTER — OFFICE VISIT (OUTPATIENT)
Dept: ORTHOPEDIC SURGERY | Facility: CLINIC | Age: 54
End: 2024-04-16
Payer: OTHER GOVERNMENT

## 2024-04-16 DIAGNOSIS — M54.16 LUMBAR RADICULOPATHY: Primary | ICD-10-CM

## 2024-04-16 PROCEDURE — 99213 OFFICE O/P EST LOW 20 MIN: CPT

## 2024-04-16 NOTE — PROGRESS NOTES
Lexi Salazar  is a 54 y.o. year-old  female. she  returns for follow up of right knee pain. She was able to attend 1 session of PT. Notes she now has pain in her glut that radiates down the back of her leg to her knee. Feels its her sciatic nerve. No new injury.       Past Medical, Family, and Social History reviewed   Review of Systems  A complete review of systems was conducted, pertinent only to the HPI noted above.  Constitutional: None  Eyes: No additions to above history  Ears, Nose, Throat: No additions to above history  Cardiovascular: No additions to above history  Respiratory: No additions to above history  GI: No additions to above history  : No additions to above history  Skin/Neuro: No additions to above history  Endocrine/Heme/Lymph: No additions to above history  Immunologic: No additions to above history  Psychiatric: No additions to above history  Musculoskeletal: see above    GEN: Alert and Oriented x 3  Constitutional: Well appearing , in no apparent distress.  Skin: No rashes, erythema, or induration around knee    MUSCULOSKELETAL EXAM:     Right KNEE:  ROM:  [0]/ [0] / 125  Effusion: -  Alignment: [neutral]      Gait: [normal]  Sensation intact bilaterally sural/saphenous/sp/dp/tibial nerve bilaterally  Motor 5/5 knee flexion/extension/foot DF/PF/EHL/FHL bilaterally  Palpable/symmetric DP and PT pulse bilaterally      PATELLAR/EXTENSOR MECHANISM:   KNEE:  Patellar Crepitus: n  Patellar Compression Pain:n  Patellar Apprehension: [no]  Extensor Mechanism: [intact]  Straight Leg Raise: good      LIGAMENTS:  ACL: Lachmans: [negative]  PCL: [stable]  Valgus at 0 degrees: [stable]  Valgus at 30 degrees: [stable]  Varus at 0 degrees: [stable]  Varus at 30 degrees: [stable]    MENISCUS EXAM:  Joint Line Tenderness:none   McMurrays: [negative]  Pain with Deep Flexion: [No]    Xrays independently viewed and interpreted: very mild tricompartmental DJD, most prominent in the patellofemoral  compartment. No fracture or dislocation    Given the patients symptoms and clinical exam findings, these are more so consistent with a spinal pathology rather than a true knee pathology. Recommend she be seen and evaluated by one of our spine providers for further evaluation. She elected to proceed. She was provided their contact information today and will call to schedule an appointment. She declined a tapered steroid pack. She will contact our office should any issues or concerns arise in the future. All questions answered, patient in agreement with plan.

## 2024-04-17 DIAGNOSIS — E78.49 OTHER HYPERLIPIDEMIA: ICD-10-CM

## 2024-04-18 RX ORDER — ATORVASTATIN CALCIUM 40 MG/1
40 TABLET, FILM COATED ORAL DAILY
Qty: 90 TABLET | Refills: 3 | Status: SHIPPED | OUTPATIENT
Start: 2024-04-18

## 2024-04-22 ENCOUNTER — APPOINTMENT (OUTPATIENT)
Dept: PRIMARY CARE | Facility: CLINIC | Age: 54
End: 2024-04-22
Payer: OTHER GOVERNMENT

## 2024-04-25 ENCOUNTER — OFFICE VISIT (OUTPATIENT)
Dept: ORTHOPEDIC SURGERY | Facility: CLINIC | Age: 54
End: 2024-04-25
Payer: OTHER GOVERNMENT

## 2024-04-25 DIAGNOSIS — M65.30 TRIGGER FINGER, ACQUIRED: Primary | ICD-10-CM

## 2024-04-25 PROCEDURE — 1036F TOBACCO NON-USER: CPT | Performed by: ORTHOPAEDIC SURGERY

## 2024-04-25 PROCEDURE — 99024 POSTOP FOLLOW-UP VISIT: CPT | Performed by: ORTHOPAEDIC SURGERY

## 2024-04-25 ASSESSMENT — PAIN - FUNCTIONAL ASSESSMENT: PAIN_FUNCTIONAL_ASSESSMENT: NO/DENIES PAIN

## 2024-04-25 NOTE — PROGRESS NOTES
History of Present Illness  Chief Complaint   Patient presents with    Right Hand - Post-op     4/12/24 right middle finger trigger release    And ring finger trigger release    Locking and catching resolved.  Some residual stiffness.  Pain controlled     Examination  Right hand:  Incision is well healing. No signs of infection.  Sensation grossly intact distally.  Capillary refill less than 2 seconds.  0.5 cm DPC.  Able to fully extend all digits.     Assessment:  Patient status post right middle and ring finger trigger releases     Plan  Patient will begin scar tissue massage.  Continue mobilization and progression of activities.  We discussed expected recovery course as well as likely peak reaction at 4 to 6 weeks postoperatively.  Follow-up as needed.  Questions addressed.

## 2024-04-29 ENCOUNTER — APPOINTMENT (OUTPATIENT)
Dept: PHYSICAL THERAPY | Facility: CLINIC | Age: 54
End: 2024-04-29
Payer: OTHER GOVERNMENT

## 2024-05-02 ENCOUNTER — APPOINTMENT (OUTPATIENT)
Dept: PHYSICAL THERAPY | Facility: CLINIC | Age: 54
End: 2024-05-02
Payer: OTHER GOVERNMENT

## 2024-05-06 ENCOUNTER — APPOINTMENT (OUTPATIENT)
Dept: PHYSICAL THERAPY | Facility: CLINIC | Age: 54
End: 2024-05-06
Payer: OTHER GOVERNMENT

## 2024-05-09 ENCOUNTER — APPOINTMENT (OUTPATIENT)
Dept: PHYSICAL THERAPY | Facility: CLINIC | Age: 54
End: 2024-05-09
Payer: OTHER GOVERNMENT

## 2024-05-20 ENCOUNTER — APPOINTMENT (OUTPATIENT)
Dept: PHYSICAL THERAPY | Facility: CLINIC | Age: 54
End: 2024-05-20
Payer: OTHER GOVERNMENT

## 2024-05-23 ENCOUNTER — APPOINTMENT (OUTPATIENT)
Dept: PHYSICAL THERAPY | Facility: CLINIC | Age: 54
End: 2024-05-23
Payer: OTHER GOVERNMENT

## 2024-05-23 ENCOUNTER — HOSPITAL ENCOUNTER (OUTPATIENT)
Dept: RADIOLOGY | Facility: HOSPITAL | Age: 54
Discharge: HOME | End: 2024-05-23
Payer: OTHER GOVERNMENT

## 2024-05-23 VITALS — BODY MASS INDEX: 39.65 KG/M2 | HEIGHT: 61 IN | WEIGHT: 210 LBS

## 2024-05-23 DIAGNOSIS — Z12.31 ENCOUNTER FOR SCREENING MAMMOGRAM FOR MALIGNANT NEOPLASM OF BREAST: ICD-10-CM

## 2024-05-23 PROCEDURE — 77063 BREAST TOMOSYNTHESIS BI: CPT | Performed by: STUDENT IN AN ORGANIZED HEALTH CARE EDUCATION/TRAINING PROGRAM

## 2024-05-23 PROCEDURE — 77067 SCR MAMMO BI INCL CAD: CPT | Performed by: STUDENT IN AN ORGANIZED HEALTH CARE EDUCATION/TRAINING PROGRAM

## 2024-05-23 PROCEDURE — 77067 SCR MAMMO BI INCL CAD: CPT

## 2024-06-06 ENCOUNTER — APPOINTMENT (OUTPATIENT)
Dept: PRIMARY CARE | Facility: CLINIC | Age: 54
End: 2024-06-06
Payer: OTHER GOVERNMENT

## 2024-06-11 ENCOUNTER — HOSPITAL ENCOUNTER (OUTPATIENT)
Dept: RADIOLOGY | Facility: HOSPITAL | Age: 54
Discharge: HOME | End: 2024-06-11
Payer: OTHER GOVERNMENT

## 2024-06-11 ENCOUNTER — OFFICE VISIT (OUTPATIENT)
Dept: PRIMARY CARE | Facility: CLINIC | Age: 54
End: 2024-06-11
Payer: OTHER GOVERNMENT

## 2024-06-11 VITALS
OXYGEN SATURATION: 96 % | WEIGHT: 206.4 LBS | HEIGHT: 61 IN | TEMPERATURE: 97.7 F | SYSTOLIC BLOOD PRESSURE: 124 MMHG | RESPIRATION RATE: 18 BRPM | HEART RATE: 83 BPM | DIASTOLIC BLOOD PRESSURE: 82 MMHG | BODY MASS INDEX: 38.97 KG/M2

## 2024-06-11 DIAGNOSIS — E78.2 MIXED HYPERLIPIDEMIA: ICD-10-CM

## 2024-06-11 DIAGNOSIS — F41.9 ANXIETY: ICD-10-CM

## 2024-06-11 DIAGNOSIS — R73.09 ELEVATED GLUCOSE: ICD-10-CM

## 2024-06-11 DIAGNOSIS — M54.10 RADICULAR LOW BACK PAIN: ICD-10-CM

## 2024-06-11 DIAGNOSIS — Z00.00 WELL ADULT EXAM: Primary | ICD-10-CM

## 2024-06-11 DIAGNOSIS — E55.9 VITAMIN D DEFICIENCY: ICD-10-CM

## 2024-06-11 DIAGNOSIS — T75.3XXA MOTION SICKNESS, INITIAL ENCOUNTER: ICD-10-CM

## 2024-06-11 DIAGNOSIS — Z78.0 ASYMPTOMATIC MENOPAUSAL STATE: ICD-10-CM

## 2024-06-11 LAB
25(OH)D3 SERPL-MCNC: 52 NG/ML (ref 31–100)
ALBUMIN SERPL-MCNC: 4.4 G/DL (ref 3.5–5)
ALP BLD-CCNC: 102 U/L (ref 35–125)
ALT SERPL-CCNC: 16 U/L (ref 5–40)
ANION GAP SERPL CALC-SCNC: 14 MMOL/L
AST SERPL-CCNC: 17 U/L (ref 5–40)
BASOPHILS # BLD AUTO: 0.06 X10*3/UL (ref 0–0.1)
BASOPHILS NFR BLD AUTO: 0.9 %
BILIRUB SERPL-MCNC: 0.5 MG/DL (ref 0.1–1.2)
BUN SERPL-MCNC: 15 MG/DL (ref 8–25)
CALCIUM SERPL-MCNC: 9.3 MG/DL (ref 8.5–10.4)
CHLORIDE SERPL-SCNC: 104 MMOL/L (ref 97–107)
CHOLEST SERPL-MCNC: 166 MG/DL (ref 133–200)
CHOLEST/HDLC SERPL: 3.5 {RATIO}
CO2 SERPL-SCNC: 25 MMOL/L (ref 24–31)
CREAT SERPL-MCNC: 1 MG/DL (ref 0.4–1.6)
EGFRCR SERPLBLD CKD-EPI 2021: 67 ML/MIN/1.73M*2
EOSINOPHIL # BLD AUTO: 0.14 X10*3/UL (ref 0–0.7)
EOSINOPHIL NFR BLD AUTO: 2 %
ERYTHROCYTE [DISTWIDTH] IN BLOOD BY AUTOMATED COUNT: 12.7 % (ref 11.5–14.5)
GLUCOSE SERPL-MCNC: 101 MG/DL (ref 65–99)
HCT VFR BLD AUTO: 42.8 % (ref 36–46)
HDLC SERPL-MCNC: 47 MG/DL
HGB BLD-MCNC: 13.9 G/DL (ref 12–16)
IMM GRANULOCYTES # BLD AUTO: 0.02 X10*3/UL (ref 0–0.7)
IMM GRANULOCYTES NFR BLD AUTO: 0.3 % (ref 0–0.9)
LDLC SERPL CALC-MCNC: 90 MG/DL (ref 65–130)
LYMPHOCYTES # BLD AUTO: 1.95 X10*3/UL (ref 1.2–4.8)
LYMPHOCYTES NFR BLD AUTO: 27.9 %
MCH RBC QN AUTO: 29.7 PG (ref 26–34)
MCHC RBC AUTO-ENTMCNC: 32.5 G/DL (ref 32–36)
MCV RBC AUTO: 92 FL (ref 80–100)
MONOCYTES # BLD AUTO: 0.48 X10*3/UL (ref 0.1–1)
MONOCYTES NFR BLD AUTO: 6.9 %
NEUTROPHILS # BLD AUTO: 4.35 X10*3/UL (ref 1.2–7.7)
NEUTROPHILS NFR BLD AUTO: 62 %
NRBC BLD-RTO: 0 /100 WBCS (ref 0–0)
PLATELET # BLD AUTO: 253 X10*3/UL (ref 150–450)
POTASSIUM SERPL-SCNC: 3.8 MMOL/L (ref 3.4–5.1)
PROT SERPL-MCNC: 7 G/DL (ref 5.9–7.9)
RBC # BLD AUTO: 4.68 X10*6/UL (ref 4–5.2)
SODIUM SERPL-SCNC: 143 MMOL/L (ref 133–145)
TRIGL SERPL-MCNC: 143 MG/DL (ref 40–150)
TSH SERPL DL<=0.05 MIU/L-ACNC: 2.66 MIU/L (ref 0.27–4.2)
WBC # BLD AUTO: 7 X10*3/UL (ref 4.4–11.3)

## 2024-06-11 PROCEDURE — 72110 X-RAY EXAM L-2 SPINE 4/>VWS: CPT

## 2024-06-11 PROCEDURE — 82306 VITAMIN D 25 HYDROXY: CPT | Performed by: NURSE PRACTITIONER

## 2024-06-11 PROCEDURE — 99396 PREV VISIT EST AGE 40-64: CPT | Performed by: NURSE PRACTITIONER

## 2024-06-11 PROCEDURE — 84443 ASSAY THYROID STIM HORMONE: CPT | Performed by: NURSE PRACTITIONER

## 2024-06-11 PROCEDURE — 80053 COMPREHEN METABOLIC PANEL: CPT | Performed by: NURSE PRACTITIONER

## 2024-06-11 PROCEDURE — 80061 LIPID PANEL: CPT | Performed by: NURSE PRACTITIONER

## 2024-06-11 PROCEDURE — 85025 COMPLETE CBC W/AUTO DIFF WBC: CPT | Performed by: NURSE PRACTITIONER

## 2024-06-11 PROCEDURE — 1036F TOBACCO NON-USER: CPT | Performed by: NURSE PRACTITIONER

## 2024-06-11 PROCEDURE — 83036 HEMOGLOBIN GLYCOSYLATED A1C: CPT | Performed by: NURSE PRACTITIONER

## 2024-06-11 PROCEDURE — 72110 X-RAY EXAM L-2 SPINE 4/>VWS: CPT | Performed by: RADIOLOGY

## 2024-06-11 PROCEDURE — 36415 COLL VENOUS BLD VENIPUNCTURE: CPT | Performed by: NURSE PRACTITIONER

## 2024-06-11 RX ORDER — SCOLOPAMINE TRANSDERMAL SYSTEM 1 MG/1
1 PATCH, EXTENDED RELEASE TRANSDERMAL
Qty: 10 PATCH | Refills: 0 | Status: SHIPPED | OUTPATIENT
Start: 2024-06-11 | End: 2024-08-10

## 2024-06-11 ASSESSMENT — LIFESTYLE VARIABLES
HOW MANY STANDARD DRINKS CONTAINING ALCOHOL DO YOU HAVE ON A TYPICAL DAY: PATIENT DOES NOT DRINK
HOW OFTEN DO YOU HAVE SIX OR MORE DRINKS ON ONE OCCASION: NEVER
SKIP TO QUESTIONS 9-10: 1
HOW OFTEN DO YOU HAVE A DRINK CONTAINING ALCOHOL: NEVER
AUDIT-C TOTAL SCORE: 0

## 2024-06-11 ASSESSMENT — PAIN SCALES - GENERAL: PAINLEVEL: 0-NO PAIN

## 2024-06-11 ASSESSMENT — ENCOUNTER SYMPTOMS
ARTHRALGIAS: 1
BACK PAIN: 1

## 2024-06-11 NOTE — PROGRESS NOTES
"Subjective   Patient ID: Lexi Salazar is a 54 y.o. female who presents for Annual Exam (PT IS HERE FOR ANNUAL EXAM/NORMAL PAP 04/2023).    Here for  a well visit         Review of Systems   Musculoskeletal:  Positive for arthralgias and back pain.   All other systems reviewed and are negative.      Objective   /82   Pulse 83   Temp 36.5 °C (97.7 °F)   Resp 18   Ht 1.549 m (5' 1\")   Wt 93.6 kg (206 lb 6.4 oz)   SpO2 96%   BMI 39.00 kg/m²     Physical Exam  Constitutional:       Appearance: Normal appearance. She is obese.   HENT:      Right Ear: Tympanic membrane normal.      Left Ear: Tympanic membrane normal.      Mouth/Throat:      Mouth: Mucous membranes are moist.   Eyes:      Conjunctiva/sclera: Conjunctivae normal.   Cardiovascular:      Rate and Rhythm: Normal rate and regular rhythm.      Pulses: Normal pulses.      Heart sounds: Normal heart sounds.   Pulmonary:      Effort: Pulmonary effort is normal.   Abdominal:      General: Bowel sounds are normal.      Palpations: Abdomen is soft.   Genitourinary:     Comments: BREAST EXAM OK  Musculoskeletal:         General: Swelling present.   Neurological:      Mental Status: She is alert and oriented to person, place, and time.   Psychiatric:         Behavior: Behavior normal.       Assessment/Plan   Problem List Items Addressed This Visit             ICD-10-CM    Anxiety F41.9    Relevant Orders    TSH with reflex to Free T4 if abnormal    Hyperlipidemia E78.5    Relevant Orders    CBC and Auto Differential    TSH with reflex to Free T4 if abnormal    Comprehensive Metabolic Panel    Lipid Panel    Vitamin D deficiency E55.9    Relevant Orders    Vitamin D 25-Hydroxy,Total (for eval of Vitamin D levels)     Other Visit Diagnoses         Codes    Well adult exam    -  Primary Z00.00    Relevant Orders    CBC and Auto Differential    TSH with reflex to Free T4 if abnormal    Comprehensive Metabolic Panel    Lipid Panel    Asymptomatic menopausal " state     Z78.0    Relevant Orders    TSH with reflex to Free T4 if abnormal    Motion sickness, initial encounter     T75.3XXA    Relevant Medications    scopolamine (Transderm-Scop) 1 mg over 3 days patch 3 day    Radicular low back pain     M54.10    Relevant Orders    XR lumbar spine complete 4+ views    Referral to Orthopaedic Surgery

## 2024-06-13 DIAGNOSIS — R73.09 ELEVATED GLUCOSE: ICD-10-CM

## 2024-06-13 LAB
EST. AVERAGE GLUCOSE BLD GHB EST-MCNC: 117 MG/DL
HBA1C MFR BLD: 5.7 %

## 2024-06-24 ENCOUNTER — DOCUMENTATION (OUTPATIENT)
Dept: PHYSICAL THERAPY | Facility: CLINIC | Age: 54
End: 2024-06-24
Payer: OTHER GOVERNMENT

## 2024-06-24 NOTE — PROGRESS NOTES
Physical Therapy    Discharge Summary    Name: Lexi Salazar  MRN: [id number]  : 1970  Date: 24    Discharge Summary: PT    Discharge Information: Date of discharge 2024    Therapy Summary: Eval only.  Pt did not return    Discharge Status: Unknown     Rehab Discharge Reason: Failed to schedule and/or keep follow-up appointment(s)

## 2024-07-26 ENCOUNTER — APPOINTMENT (OUTPATIENT)
Dept: ORTHOPEDIC SURGERY | Facility: CLINIC | Age: 54
End: 2024-07-26
Payer: OTHER GOVERNMENT

## 2024-07-26 VITALS — HEIGHT: 61 IN | BODY MASS INDEX: 38.89 KG/M2 | WEIGHT: 206 LBS

## 2024-07-26 DIAGNOSIS — M54.10 RADICULAR LOW BACK PAIN: ICD-10-CM

## 2024-07-26 DIAGNOSIS — G57.31 PERONEAL NEUROPATHY AT KNEE, RIGHT: Primary | ICD-10-CM

## 2024-07-26 PROCEDURE — 99214 OFFICE O/P EST MOD 30 MIN: CPT | Performed by: ORTHOPAEDIC SURGERY

## 2024-07-26 PROCEDURE — 3008F BODY MASS INDEX DOCD: CPT | Performed by: ORTHOPAEDIC SURGERY

## 2024-07-26 ASSESSMENT — PAIN - FUNCTIONAL ASSESSMENT: PAIN_FUNCTIONAL_ASSESSMENT: 0-10

## 2024-07-26 NOTE — PROGRESS NOTES
HPI:Lexi Salazar Is a 54-year-old woman, describes a history of some right-sided knee pain with radiation into the lateral portion of the leg and foot.  She noticed it first in March 2024 when she was bending on her knee and she got up suddenly.      ROS:  Reviewed on EMR and patient intake sheet.    PMH/SH:  Reviewed on EMR and patient intake sheet.    Exam:  Physical Exam    Constitutional: Well appearing; no acute distress  Eyes: pupils are equal and round  Psych: normal affect  Respiratory: non-labored breathing  Cardiovascular: regular rate and rhythm  GI: non-distended abdomen  Musculoskeletal: no pain with range of motion of the hips bilaterally  Neurologic: [5]/5 strength in the lower extremities bilaterally]; [negative] straight leg raise    Radiology:     X-rays of the lumbar spine demonstrate mild degenerative changes.    Diagnosis:    Peroneal neuropathy versus lumbar radiculopathy    Assessment and Plan:   54-year-old woman, with some radiating symptoms in her right lateral leg which are most likely related to a peroneal neuropathy which happened after deep knee flexion.  At this point her symptoms are resolving.  No further treatment required at this time.  I can see her back as needed.    The patient was in agreement with the plan. At the end of the visit today, the patient felt that all questions had been answered satisfactorily.  The patient was pleased with the visit and very appreciative for the care rendered.     Thank you very much for the kind referral.  It is a privilege, and a pleasure, to partner with you in the care of your patients.  I would be delighted to assist you with any further consultations as needed.          Tyler Rod MD    Chief of Spine Surgery, [facility]  Director of Spine Service, [facility]  , Department of Orthopaedics  [facility] [employer]  [address]  [place] [address]  P: [phone]  [facility] [email or web address]    This note was dictated with voice recognition software.  It has not been proofread for grammatical errors, typographical mistakes or other semantic inconsistencies.

## 2024-08-29 ENCOUNTER — OFFICE VISIT (OUTPATIENT)
Dept: ORTHOPEDIC SURGERY | Facility: CLINIC | Age: 54
End: 2024-08-29
Payer: OTHER GOVERNMENT

## 2024-08-29 DIAGNOSIS — G56.01 CARPAL TUNNEL SYNDROME ON RIGHT: ICD-10-CM

## 2024-08-29 PROCEDURE — 1036F TOBACCO NON-USER: CPT | Performed by: ORTHOPAEDIC SURGERY

## 2024-08-29 PROCEDURE — 99213 OFFICE O/P EST LOW 20 MIN: CPT | Performed by: ORTHOPAEDIC SURGERY

## 2024-08-29 ASSESSMENT — PAIN - FUNCTIONAL ASSESSMENT: PAIN_FUNCTIONAL_ASSESSMENT: NO/DENIES PAIN

## 2024-08-29 ASSESSMENT — ENCOUNTER SYMPTOMS
DEPRESSION: 0
OCCASIONAL FEELINGS OF UNSTEADINESS: 0
LOSS OF SENSATION IN FEET: 0

## 2024-08-30 NOTE — PROGRESS NOTES
History of Present Illness:  Chief Complaint   Patient presents with    Right Hand - Follow-up     Right middle finger pain s/p right middle finger trigger release on 24       Patient reports some continued stiffness into her right hand.  She has also noticed some decreased  strength as well.  She has had prior carpal tunnel release with good relief of symptoms following that surgery.  She has also noticed inability to fully extend her digits due to some scar tissue in the palm.    Past Medical History:   Diagnosis Date    Calculus of kidney     Bilateral kidney stones    Carpal tunnel syndrome, bilateral upper limbs 2016    Severe carpal tunnel syndrome of both wrists    Carpal tunnel syndrome, left upper limb 2016    Carpal tunnel syndrome of left wrist    Carpal tunnel syndrome, left upper limb 2016    Carpal tunnel syndrome of left wrist    Encounter for full-term uncomplicated delivery ([facility])      (spontaneous vaginal delivery)    Kidney stone 2023    Menorrhagia with regular cycle 2023    Obesity with body mass index 30 or greater 2023    Personal history of other endocrine, nutritional and metabolic disease 08/10/2015    History of obesity    Personal history of other mental and behavioral disorders 2015    History of depression    Personal history of other specified conditions 2017    History of motion sickness    Personal history of other specified conditions 2016    History of palpitations    Varicella without complication     Varicella without complication       Medication Documentation Review Audit       Reviewed by Sharron Linares CMA (Medical Assistant) on 24 at 1044      Medication Order Taking? Sig Documenting Provider Last Dose Status   atorvastatin (Lipitor) 40 mg tablet [id number] No TAKE 1 TABLET DAILY Shonda Urbano, APRN-CNP Taking Active   ergocalciferol (Vitamin D2) 1.25 MG (46023 UT) capsule [id number] No Take 1  capsule (50,000 Units) by mouth 1 (one) time per week. JEFF Kong-CNP Taking Active   estradiol (Estrace) 1 mg tablet [id number] No Take 1 tablet (1 mg) by mouth once daily. Historical Provider, MD Taking Active   medroxyPROGESTERone (Provera) 5 mg tablet [id number] No Take 1 tablet (5 mg) by mouth once every 24 hours. Historical Provider, MD Taking Active   sertraline (Zoloft) 50 mg tablet [id number] No TAKE 1 TABLET DAILY JEFF Kong-CNP Taking Active                    No Known Allergies    Social History     Socioeconomic History    Marital status:      Spouse name: Not on file    Number of children: Not on file    Years of education: Not on file    Highest education level: Not on file   Occupational History    Not on file   Tobacco Use    Smoking status: Never    Smokeless tobacco: Never   Substance and Sexual Activity    Alcohol use: Not Currently    Drug use: Never    Sexual activity: Yes   Other Topics Concern    Not on file   Social History Narrative    Not on file     Social Determinants of Health     Financial Resource Strain: Not on file   Food Insecurity: Not on file   Transportation Needs: Not on file   Physical Activity: Not on file   Stress: Not on file   Social Connections: Not on file   Intimate Partner Violence: Not on file   Housing Stability: Not on file       Past Surgical History:   Procedure Laterality Date    MOUTH SURGERY  11/16/2017    Oral Surgery Tooth Extraction    OTHER SURGICAL HISTORY  11/16/2017    Cardiac Catheter His Ablation    TRIGGER FINGER RELEASE Right 04/2024        Review of Systems   GENERAL: Negative for malaise, significant weight loss, fever  MUSCULOSKELETAL: see HPI  NEURO:  Negative     Physical Examination  Constitutional: Appears well-developed and well-nourished.  Head: Normocephalic and atraumatic.  Eyes: EOMI grossly  Cardiovascular: Intact distal pulses.   Respiratory: Effort normal. No respiratory distress.  Neurologic: Alert and  oriented to person, place, and time.  Skin: Skin is warm and dry.  Hematologic / Lymphatic: No lymphedema, lymphangitis.  Psychiatric: normal mood and affect. Behavior is normal.   Musculoskeletal:  Right hand: Well-healed incisional scars overlying right middle and ring finger A1 pulleys as well as carpal tunnel.  There is some Dupuytren's tissue in line with the middle and ring finger and associated 5 degree middle and ring finger MCP contracture.  No PIP contracture.  0 cm DPC.  No tenderness about the A1 pulleys.  Positive Tinel's at level of carpal tunnel.  Positive Durkan's.  Sensation grossly intact to light touch throughout right hand and fingers.  2+ radial pulse.     Assessment:  Patient with some residual stiffness following right middle and ring finger trigger releases and development of mild Dupuytren's tissue.  Patient's exam also concerning for recurrent carpal tunnel symptoms, which would also potentially explain some of her other increased weakness     Plan:  We reviewed potential diagnoses and she understands possibility of worsening of Dupuytren's tissue in the future.  At this time would not recommend any further intervention for the middle and ring fingers.  Given the symptoms consistent with recurrent carpal tunnel syndrome a neuromuscular ultrasound has been ordered for further evaluation.  Plan for follow-up after ultrasound is completed.  Questions addressed.

## 2024-09-26 ENCOUNTER — OFFICE VISIT (OUTPATIENT)
Dept: PRIMARY CARE | Facility: CLINIC | Age: 54
End: 2024-09-26
Payer: OTHER GOVERNMENT

## 2024-09-26 ENCOUNTER — HOSPITAL ENCOUNTER (OUTPATIENT)
Dept: RADIOLOGY | Facility: HOSPITAL | Age: 54
Discharge: HOME | End: 2024-09-26
Payer: OTHER GOVERNMENT

## 2024-09-26 VITALS
OXYGEN SATURATION: 98 % | DIASTOLIC BLOOD PRESSURE: 80 MMHG | HEART RATE: 60 BPM | WEIGHT: 207 LBS | RESPIRATION RATE: 18 BRPM | TEMPERATURE: 97.7 F | SYSTOLIC BLOOD PRESSURE: 118 MMHG | BODY MASS INDEX: 39.08 KG/M2 | HEIGHT: 61 IN

## 2024-09-26 DIAGNOSIS — Z87.442 HISTORY OF KIDNEY STONES: Primary | ICD-10-CM

## 2024-09-26 DIAGNOSIS — M54.50 ACUTE LEFT-SIDED LOW BACK PAIN, UNSPECIFIED WHETHER SCIATICA PRESENT: ICD-10-CM

## 2024-09-26 DIAGNOSIS — Z23 ENCOUNTER FOR IMMUNIZATION: ICD-10-CM

## 2024-09-26 LAB
POC APPEARANCE, URINE: CLEAR
POC BILIRUBIN, URINE: NEGATIVE
POC BLOOD, URINE: NEGATIVE
POC COLOR, URINE: YELLOW
POC GLUCOSE, URINE: NEGATIVE MG/DL
POC KETONES, URINE: NEGATIVE MG/DL
POC LEUKOCYTES, URINE: NEGATIVE
POC NITRITE,URINE: NEGATIVE
POC PH, URINE: 6.5 PH
POC PROTEIN, URINE: NEGATIVE MG/DL
POC SPECIFIC GRAVITY, URINE: >=1.03
POC UROBILINOGEN, URINE: 0.2 EU/DL

## 2024-09-26 PROCEDURE — 87086 URINE CULTURE/COLONY COUNT: CPT | Mod: WESLAB | Performed by: NURSE PRACTITIONER

## 2024-09-26 PROCEDURE — 90471 IMMUNIZATION ADMIN: CPT | Performed by: NURSE PRACTITIONER

## 2024-09-26 PROCEDURE — 74018 RADEX ABDOMEN 1 VIEW: CPT

## 2024-09-26 PROCEDURE — 81003 URINALYSIS AUTO W/O SCOPE: CPT | Performed by: NURSE PRACTITIONER

## 2024-09-26 PROCEDURE — 99213 OFFICE O/P EST LOW 20 MIN: CPT | Performed by: NURSE PRACTITIONER

## 2024-09-26 RX ORDER — TAMSULOSIN HYDROCHLORIDE 0.4 MG/1
0.4 CAPSULE ORAL DAILY
Qty: 7 CAPSULE | Refills: 0 | Status: SHIPPED | OUTPATIENT
Start: 2024-09-26 | End: 2024-10-03

## 2024-09-26 RX ORDER — ONDANSETRON 8 MG/1
8 TABLET, ORALLY DISINTEGRATING ORAL EVERY 8 HOURS PRN
Qty: 20 TABLET | Refills: 0 | Status: SHIPPED | OUTPATIENT
Start: 2024-09-26 | End: 2024-10-03

## 2024-09-26 ASSESSMENT — ENCOUNTER SYMPTOMS: BACK PAIN: 1

## 2024-09-26 ASSESSMENT — PAIN SCALES - GENERAL: PAINLEVEL: 1

## 2024-09-26 ASSESSMENT — LIFESTYLE VARIABLES: HOW MANY STANDARD DRINKS CONTAINING ALCOHOL DO YOU HAVE ON A TYPICAL DAY: PATIENT DOES NOT DRINK

## 2024-09-26 ASSESSMENT — PATIENT HEALTH QUESTIONNAIRE - PHQ9
1. LITTLE INTEREST OR PLEASURE IN DOING THINGS: NOT AT ALL
SUM OF ALL RESPONSES TO PHQ9 QUESTIONS 1 AND 2: 0
2. FEELING DOWN, DEPRESSED OR HOPELESS: NOT AT ALL

## 2024-09-26 NOTE — PROGRESS NOTES
"Subjective   Patient ID: Lexi Salazar is a 54 y.o. female who presents for Back Pain (PT C/O LOW BACK PAIN ON LEFT SIDE 2 DAYS. PT STATES SHE HAS HISTORY OF KIDNEY STONES AND IS CONCERNED THIS IS THE START OF ONE. ).    Has had kidney stones in past ses dr romero feel kije she might bet getting another one keft neymar pain     Back Pain  This is a new problem. The current episode started yesterday. The problem occurs intermittently. The problem has been waxing and waning since onset. The pain is present in the lumbar spine. The quality of the pain is described as aching and cramping. The pain is at a severity of 3/10.        Review of Systems   Genitourinary:         History of kidney stones   Musculoskeletal:  Positive for back pain.       Objective   /80   Pulse 60   Temp 36.5 °C (97.7 °F)   Resp 18   Ht 1.549 m (5' 1\")   Wt 93.9 kg (207 lb)   SpO2 98%   BMI 39.11 kg/m²     Physical Exam  Constitutional:       General: She is not in acute distress.     Appearance: Normal appearance.   Cardiovascular:      Rate and Rhythm: Normal rate and regular rhythm.      Heart sounds: No murmur heard.  Pulmonary:      Breath sounds: Normal breath sounds. No wheezing.   Abdominal:      General: Bowel sounds are normal.      Palpations: Abdomen is soft.   Neurological:      Mental Status: She is alert.         Assessment/Plan   Problem List Items Addressed This Visit    None  Visit Diagnoses         Codes    History of kidney stones    -  Primary Z87.442    Relevant Medications    ondansetron ODT (Zofran-ODT) 8 mg disintegrating tablet    Acute left-sided low back pain, unspecified whether sciatica present     M54.50    Relevant Medications    tamsulosin (Flomax) 0.4 mg 24 hr capsule    ondansetron ODT (Zofran-ODT) 8 mg disintegrating tablet    Other Relevant Orders    POCT UA Automated manually resulted (Completed)    XR abdomen 1 view    Urine Culture    Encounter for immunization     Z23    Relevant Orders    " Flu vaccine, trivalent, preservative free, no egg protein, age 18y+ (Flublok) (Completed)        Discussed incresed fluids  start flomax will with results

## 2024-09-27 ENCOUNTER — APPOINTMENT (OUTPATIENT)
Dept: NEUROLOGY | Facility: HOSPITAL | Age: 54
End: 2024-09-27
Payer: OTHER GOVERNMENT

## 2024-09-27 LAB — BACTERIA UR CULT: NORMAL

## 2024-10-02 DIAGNOSIS — E55.9 VITAMIN D DEFICIENCY: ICD-10-CM

## 2024-10-03 RX ORDER — ERGOCALCIFEROL 1.25 1/1
1 CAPSULE ORAL
Qty: 12 CAPSULE | Refills: 3 | Status: SHIPPED | OUTPATIENT
Start: 2024-10-06

## 2024-10-04 ENCOUNTER — PROCEDURE VISIT (OUTPATIENT)
Dept: NEUROLOGY | Facility: HOSPITAL | Age: 54
End: 2024-10-04
Payer: OTHER GOVERNMENT

## 2024-10-04 DIAGNOSIS — G56.01 CARPAL TUNNEL SYNDROME OF RIGHT WRIST: Primary | ICD-10-CM

## 2024-10-04 PROCEDURE — 76883 US NRV&ACC STRUX 1XTR COMPRE: CPT | Performed by: STUDENT IN AN ORGANIZED HEALTH CARE EDUCATION/TRAINING PROGRAM

## 2024-10-04 NOTE — PROGRESS NOTES
NEUROMUSCULAR ULTRASOUND OF THE RIGHT UPPER EXTREMITY    INDICATION:   Clinical Information: The patient has a history of right sided carpal tunnel syndrome status post carpal tunnel release surgery 3 years ago.  Initially symptoms improved.  She then developed trigger finger on the right and in April underwent trigger finger release.  Following this she has noticed weakness in the right hand with fine motor movements and impaired dexterity.  No sensory symptoms.  Study ordered to evaluate for recurrent right median neuropathy across the wrist.    Neuromuscular ultrasound to be performed:  (a) to evaluate the echotexture and size of the right median nerve in the limb with attention to the carpal tunnel;  (b) to assess for any identifiable structural source of right median nerve compression;   (c) to assess adjacent structures around the right median nerve for abnormalities;  (d) to assess the right wrist joint for abnormalities.    HEIGHT: 5 ft 1 in  WEIGHT: 200 lbs.  HANDEDNESS: Right    COMPARISON:  None.    TECHNIQUE:  The right median nerve and accompanying structures were studied throughout their entire anatomic course in the limb from the wrist to the axilla, including real-time cine imaging. The examination was performed using a [employer] ultrasound machine with a 15-6 MHz matrix linear transducer. Both axial and longitudinal views were obtained. The patient was examined supine with the arm extended and supinated. Cross sectional area (CSA) was measured within the epineurium, using the trace method. At locations where repeat measurements were taken, the mean value is reported below. Transverse and longitudinal images were obtained.     FINDINGS:  At the right wrist at the distal wrist crease (proximal carpal tunnel), the median nerve CSA was 16.6 mm2 (NL < 10 mm2; borderline 10-13 mm2; ABN > 13 mm2).    At the right wrist distal in the palm (distal carpal tunnel), the median nerve CSA was 14.1 mm2 (NL < 10 mm2;  borderline 10-13 mm2; ABN > 13 mm2).    The echogenicity of the right median nerve at the wrist was reduced. The mobility of the right median nerve with flexion and extension of the fingers was reduced. Color Doppler of the right median nerve showed normal median nerve vascularity.  No abnormal tenosynovitis of the right flexor tendons was noted.    Using a longitudinal scan of the right median nerve at the wrist, an equivocal notch sign was seen under the flexor retinaculum.    A right persistent median artery was present. A right bifid median nerve was not present. No other abnormal mass or ganglia was appreciated in the right carpal tunnel. With extension of the fingers, there was no abnormal intrusion of the right flexor digitorum sublimis. With flexion of the fingers, there was no abnormal intrusion of the right lumbrical muscles.    In the mid-forearm, approximately 12 cm proximal to the distal wrist crease, the right median nerve was seen between the flexor digitorum sublimis and flexor digitorum profundus muscles. At the mid-forearm, the right median nerve CSA was 5.3 mm2. The ratio of the right median CSAs between the wrist and forearm (WFR) was 3.1 (NL < 1.5; borderline: 1.5 - 2.0). The echogenicity of the right median nerve in the forearm was normal.    The right median nerve was then followed proximal into the mid arm and then to the axilla. The median nerve was normal in size and echogenicity adjacent to the brachial artery and axillary artery.     Scanning the muscles, the echogenicity was normal of the flexor digitorum sublimis, flexor digitorum profundus, flexor pollicis longus, flexor carpi radialis, and pronator teres. The echogenicity of the abductor pollicis brevis was normal.    At the right wrist joint, the radial carpal joint was normal. No abnormal effusion was seen. The flexor carpi radialis tendon was normal. Both the radial and ulnar arteries were well seen and were  normal.    IMPRESSION:  This is an abnormal neuromuscular ultrasound examination of the right upper extremity.     There was ultrasound evidence of a median neuropathy at the right wrist as evidenced by enlarged median nerve cross-sectional area at the wrist and increased wrist to forearm ratio. Of note, following carpal tunnel release surgery, the size and echogenicity of the median nerve improves but may remain abnormal; however, the size is slightly larger than is typically expected post carpal tunnel release. In addition, an equivocal notch sign was seen suggestive of ongoing compression.     The median nerve was followed more proximally throughout its anatomic course and otherwise appeared normal.     The other visualized osseous, ligamentous, joint and tendinous structures appeared normal.       Performed by: Rex Frazier MD  Authorized by: Rex Frazier MD

## 2024-10-24 ENCOUNTER — TELEPHONE (OUTPATIENT)
Dept: ORTHOPEDIC SURGERY | Facility: CLINIC | Age: 54
End: 2024-10-24
Payer: OTHER GOVERNMENT

## 2024-10-24 ENCOUNTER — OFFICE VISIT (OUTPATIENT)
Dept: ORTHOPEDIC SURGERY | Facility: CLINIC | Age: 54
End: 2024-10-24
Payer: OTHER GOVERNMENT

## 2024-10-24 DIAGNOSIS — G56.01 CARPAL TUNNEL SYNDROME ON RIGHT: Primary | ICD-10-CM

## 2024-10-24 PROCEDURE — 99214 OFFICE O/P EST MOD 30 MIN: CPT | Performed by: ORTHOPAEDIC SURGERY

## 2024-10-24 PROCEDURE — 1036F TOBACCO NON-USER: CPT | Performed by: ORTHOPAEDIC SURGERY

## 2024-10-24 ASSESSMENT — ENCOUNTER SYMPTOMS
DEPRESSION: 0
OCCASIONAL FEELINGS OF UNSTEADINESS: 0
LOSS OF SENSATION IN FEET: 0

## 2024-10-24 ASSESSMENT — PATIENT HEALTH QUESTIONNAIRE - PHQ9
SUM OF ALL RESPONSES TO PHQ9 QUESTIONS 1 AND 2: 0
2. FEELING DOWN, DEPRESSED OR HOPELESS: NOT AT ALL
1. LITTLE INTEREST OR PLEASURE IN DOING THINGS: NOT AT ALL

## 2024-10-24 NOTE — TELEPHONE ENCOUNTER
Patient called and stated that she would like to schedule CTR surgery with Dr Gonzalez, patient was seen today     P: [phone]

## 2024-10-25 NOTE — H&P (VIEW-ONLY)
History of Present Illness:  Chief Complaint   Patient presents with    Right Hand - Follow-up     Neuromuscular ultrasound review      54-year-old female presents for follow-up after completion of neuromuscular ultrasound to evaluate right median nerve.  She notes some decreased  strength that is worse with increased activities.  Symptoms are somewhat similar to what she was experiencing when she had carpal tunnel syndrome.  The symptoms did initially resolve following her carpal tunnel release, but have recurred.  Essentially stable since she was last seen a couple months ago.      Past Medical History:   Diagnosis Date    Calculus of kidney     Bilateral kidney stones    Carpal tunnel syndrome, bilateral upper limbs 2016    Severe carpal tunnel syndrome of both wrists    Carpal tunnel syndrome, left upper limb 2016    Carpal tunnel syndrome of left wrist    Carpal tunnel syndrome, left upper limb 2016    Carpal tunnel syndrome of left wrist    Encounter for full-term uncomplicated delivery      (spontaneous vaginal delivery)    Kidney stone 2023    Menorrhagia with regular cycle 2023    Obesity with body mass index 30 or greater 2023    Personal history of other endocrine, nutritional and metabolic disease 08/10/2015    History of obesity    Personal history of other mental and behavioral disorders 2015    History of depression    Personal history of other specified conditions 2017    History of motion sickness    Personal history of other specified conditions 2016    History of palpitations    Varicella without complication     Varicella without complication       Medication Documentation Review Audit       Reviewed by Sharron Linares CMA (Medical Assistant) on 10/24/24 at 1044      Medication Order Taking? Sig Documenting Provider Last Dose Status   atorvastatin (Lipitor) 40 mg tablet [id number] No TAKE 1 TABLET DAILY Shonda Urbano, APRN-CNP Taking  Active   estradiol (Estrace) 1 mg tablet [id number] No Take 1 tablet (1 mg) by mouth once daily. Historical Provider, MD Taking Active   medroxyPROGESTERone (Provera) 5 mg tablet [id number] No Take 1 tablet (5 mg) by mouth once every 24 hours. Historical Provider, MD Taking Active   sertraline (Zoloft) 50 mg tablet [id number] No TAKE 1 TABLET DAILY DULCE Kong Taking Active   Vitamin D2 1,250 mcg (50,000 unit) capsule [id number]  TAKE 1 CAPSULE ONCE A WEEK DULCE Kong  Active                    No Known Allergies    Social History     Socioeconomic History    Marital status:      Spouse name: Not on file    Number of children: Not on file    Years of education: Not on file    Highest education level: Not on file   Occupational History    Not on file   Tobacco Use    Smoking status: Never    Smokeless tobacco: Never   Substance and Sexual Activity    Alcohol use: Never    Drug use: Never    Sexual activity: Yes     Partners: Male     Birth control/protection: Male Sterilization   Other Topics Concern    Not on file   Social History Narrative    Not on file     Social Drivers of Health     Financial Resource Strain: Not on file   Food Insecurity: Not on file   Transportation Needs: Not on file   Physical Activity: Not on file   Stress: Not on file   Social Connections: Not on file   Intimate Partner Violence: Not on file   Housing Stability: Not on file       Past Surgical History:   Procedure Laterality Date    MOUTH SURGERY  11/16/2017    Oral Surgery Tooth Extraction    OTHER SURGICAL HISTORY  11/16/2017    Cardiac Catheter His Ablation    TRIGGER FINGER RELEASE Right 04/2024        Review of Systems   GENERAL: Negative for malaise, significant weight loss, fever  MUSCULOSKELETAL: see HPI  NEURO:  Negative     Physical Examination  Constitutional: Appears well-developed and well-nourished.  Head: Normocephalic and atraumatic.  Eyes: EOMI grossly  Cardiovascular: Intact distal  pulses.   Respiratory: Effort normal. No respiratory distress.  Neurologic: Alert and oriented to person, place, and time.  Skin: Skin is warm and dry.  Hematologic / Lymphatic: No lymphedema, lymphangitis.  Psychiatric: normal mood and affect. Behavior is normal.   Musculoskeletal:  Right hand: Well-healed incisional scars overlying right middle and ring finger A1 pulleys as well as carpal tunnel.  Continued stable Dupuytren's tissue in line with the middle and ring finger and associated 5 degree middle and ring finger MCP contracture.  No PIP contracture.  0 cm DPC.  No tenderness about the A1 pulleys.  Positive Tinel's at level of carpal tunnel.  Positive Durkan's.  Sensation grossly intact to light touch throughout right hand and fingers.  2+ radial pulse.    Right upper extremity neuromuscular ultrasound from October 4, 2024 available for my review demonstrates changes consistent with ongoing median nerve compression with an equivocal notch sign.     Assessment: Recurrent right carpal tunnel syndrome.  Dupuytren's tissue in the palm following trigger releases.     Plan:  Risks and benefits of continued nonoperative versus operative treatment options were reviewed.  The surgical benefits and the potential complications were reviewed with the patient today, as well as the day surgical setting and the likely postoperative course.  Patient understands that the goal of surgery is prevention of worsening symptoms and potential relief of some symptoms.  Risks discussed included, but were not limited to, residual/recurrent/worsening symptoms, pain, infection, bleeding, stiffness, injury to nerve/blood vessels/tendons, wound healing issues and possible need for reoperation.  I answered the patient's questions.  The patient has elected to proceed with surgery and we will schedule it at the patient's convenience.    She will followup with us in the operating room and then postoperatively.    Plan for revision right open  carpal tunnel release with hypothenar fat flap.

## 2024-10-25 NOTE — PROGRESS NOTES
History of Present Illness:  Chief Complaint   Patient presents with    Right Hand - Follow-up     Neuromuscular ultrasound review      54-year-old female presents for follow-up after completion of neuromuscular ultrasound to evaluate right median nerve.  She notes some decreased  strength that is worse with increased activities.  Symptoms are somewhat similar to what she was experiencing when she had carpal tunnel syndrome.  The symptoms did initially resolve following her carpal tunnel release, but have recurred.  Essentially stable since she was last seen a couple months ago.      Past Medical History:   Diagnosis Date    Calculus of kidney     Bilateral kidney stones    Carpal tunnel syndrome, bilateral upper limbs 2016    Severe carpal tunnel syndrome of both wrists    Carpal tunnel syndrome, left upper limb 2016    Carpal tunnel syndrome of left wrist    Carpal tunnel syndrome, left upper limb 2016    Carpal tunnel syndrome of left wrist    Encounter for full-term uncomplicated delivery      (spontaneous vaginal delivery)    Kidney stone 2023    Menorrhagia with regular cycle 2023    Obesity with body mass index 30 or greater 2023    Personal history of other endocrine, nutritional and metabolic disease 08/10/2015    History of obesity    Personal history of other mental and behavioral disorders 2015    History of depression    Personal history of other specified conditions 2017    History of motion sickness    Personal history of other specified conditions 2016    History of palpitations    Varicella without complication     Varicella without complication       Medication Documentation Review Audit       Reviewed by Sharron Linares CMA (Medical Assistant) on 10/24/24 at 1044      Medication Order Taking? Sig Documenting Provider Last Dose Status   atorvastatin (Lipitor) 40 mg tablet [id number] No TAKE 1 TABLET DAILY Shonda Urbano, APRN-CNP Taking  Active   estradiol (Estrace) 1 mg tablet [id number] No Take 1 tablet (1 mg) by mouth once daily. Historical Provider, MD Taking Active   medroxyPROGESTERone (Provera) 5 mg tablet [id number] No Take 1 tablet (5 mg) by mouth once every 24 hours. Historical Provider, MD Taking Active   sertraline (Zoloft) 50 mg tablet [id number] No TAKE 1 TABLET DAILY DULCE Kong Taking Active   Vitamin D2 1,250 mcg (50,000 unit) capsule [id number]  TAKE 1 CAPSULE ONCE A WEEK DULCE Kong  Active                    No Known Allergies    Social History     Socioeconomic History    Marital status:      Spouse name: Not on file    Number of children: Not on file    Years of education: Not on file    Highest education level: Not on file   Occupational History    Not on file   Tobacco Use    Smoking status: Never    Smokeless tobacco: Never   Substance and Sexual Activity    Alcohol use: Never    Drug use: Never    Sexual activity: Yes     Partners: Male     Birth control/protection: Male Sterilization   Other Topics Concern    Not on file   Social History Narrative    Not on file     Social Drivers of Health     Financial Resource Strain: Not on file   Food Insecurity: Not on file   Transportation Needs: Not on file   Physical Activity: Not on file   Stress: Not on file   Social Connections: Not on file   Intimate Partner Violence: Not on file   Housing Stability: Not on file       Past Surgical History:   Procedure Laterality Date    MOUTH SURGERY  11/16/2017    Oral Surgery Tooth Extraction    OTHER SURGICAL HISTORY  11/16/2017    Cardiac Catheter His Ablation    TRIGGER FINGER RELEASE Right 04/2024        Review of Systems   GENERAL: Negative for malaise, significant weight loss, fever  MUSCULOSKELETAL: see HPI  NEURO:  Negative     Physical Examination  Constitutional: Appears well-developed and well-nourished.  Head: Normocephalic and atraumatic.  Eyes: EOMI grossly  Cardiovascular: Intact distal  pulses.   Respiratory: Effort normal. No respiratory distress.  Neurologic: Alert and oriented to person, place, and time.  Skin: Skin is warm and dry.  Hematologic / Lymphatic: No lymphedema, lymphangitis.  Psychiatric: normal mood and affect. Behavior is normal.   Musculoskeletal:  Right hand: Well-healed incisional scars overlying right middle and ring finger A1 pulleys as well as carpal tunnel.  Continued stable Dupuytren's tissue in line with the middle and ring finger and associated 5 degree middle and ring finger MCP contracture.  No PIP contracture.  0 cm DPC.  No tenderness about the A1 pulleys.  Positive Tinel's at level of carpal tunnel.  Positive Durkan's.  Sensation grossly intact to light touch throughout right hand and fingers.  2+ radial pulse.    Right upper extremity neuromuscular ultrasound from October 4, 2024 available for my review demonstrates changes consistent with ongoing median nerve compression with an equivocal notch sign.     Assessment: Recurrent right carpal tunnel syndrome.  Dupuytren's tissue in the palm following trigger releases.     Plan:  Risks and benefits of continued nonoperative versus operative treatment options were reviewed.  The surgical benefits and the potential complications were reviewed with the patient today, as well as the day surgical setting and the likely postoperative course.  Patient understands that the goal of surgery is prevention of worsening symptoms and potential relief of some symptoms.  Risks discussed included, but were not limited to, residual/recurrent/worsening symptoms, pain, infection, bleeding, stiffness, injury to nerve/blood vessels/tendons, wound healing issues and possible need for reoperation.  I answered the patient's questions.  The patient has elected to proceed with surgery and we will schedule it at the patient's convenience.    She will followup with us in the operating room and then postoperatively.    Plan for revision right open  carpal tunnel release with hypothenar fat flap.

## 2024-10-31 ENCOUNTER — ANESTHESIA EVENT (OUTPATIENT)
Dept: OPERATING ROOM | Facility: CLINIC | Age: 54
End: 2024-10-31
Payer: OTHER GOVERNMENT

## 2024-11-08 ENCOUNTER — HOSPITAL ENCOUNTER (OUTPATIENT)
Facility: CLINIC | Age: 54
Setting detail: OUTPATIENT SURGERY
Discharge: HOME | End: 2024-11-08
Attending: ORTHOPAEDIC SURGERY | Admitting: ORTHOPAEDIC SURGERY
Payer: OTHER GOVERNMENT

## 2024-11-08 ENCOUNTER — ANESTHESIA (OUTPATIENT)
Dept: OPERATING ROOM | Facility: CLINIC | Age: 54
End: 2024-11-08
Payer: OTHER GOVERNMENT

## 2024-11-08 VITALS
HEIGHT: 61 IN | HEART RATE: 86 BPM | RESPIRATION RATE: 18 BRPM | WEIGHT: 209.44 LBS | TEMPERATURE: 97.5 F | DIASTOLIC BLOOD PRESSURE: 68 MMHG | OXYGEN SATURATION: 99 % | BODY MASS INDEX: 39.54 KG/M2 | SYSTOLIC BLOOD PRESSURE: 145 MMHG

## 2024-11-08 DIAGNOSIS — G56.01 CARPAL TUNNEL SYNDROME ON RIGHT: Primary | ICD-10-CM

## 2024-11-08 PROBLEM — E66.9 OBESITY: Status: ACTIVE | Noted: 2023-11-24

## 2024-11-08 PROBLEM — I47.19 OTHER SUPRAVENTRICULAR TACHYCARDIA: Status: ACTIVE | Noted: 2024-11-08

## 2024-11-08 PROCEDURE — 3600000008 HC OR TIME - EACH INCREMENTAL 1 MINUTE - PROCEDURE LEVEL THREE: Performed by: ORTHOPAEDIC SURGERY

## 2024-11-08 PROCEDURE — 7100000001 HC RECOVERY ROOM TIME - INITIAL BASE CHARGE: Performed by: ORTHOPAEDIC SURGERY

## 2024-11-08 PROCEDURE — 2500000004 HC RX 250 GENERAL PHARMACY W/ HCPCS (ALT 636 FOR OP/ED): Performed by: ORTHOPAEDIC SURGERY

## 2024-11-08 PROCEDURE — 7100000010 HC PHASE TWO TIME - EACH INCREMENTAL 1 MINUTE: Performed by: ORTHOPAEDIC SURGERY

## 2024-11-08 PROCEDURE — 2500000004 HC RX 250 GENERAL PHARMACY W/ HCPCS (ALT 636 FOR OP/ED)

## 2024-11-08 PROCEDURE — 3700000001 HC GENERAL ANESTHESIA TIME - INITIAL BASE CHARGE: Performed by: ORTHOPAEDIC SURGERY

## 2024-11-08 PROCEDURE — 7100000002 HC RECOVERY ROOM TIME - EACH INCREMENTAL 1 MINUTE: Performed by: ORTHOPAEDIC SURGERY

## 2024-11-08 PROCEDURE — 3600000003 HC OR TIME - INITIAL BASE CHARGE - PROCEDURE LEVEL THREE: Performed by: ORTHOPAEDIC SURGERY

## 2024-11-08 PROCEDURE — 7100000009 HC PHASE TWO TIME - INITIAL BASE CHARGE: Performed by: ORTHOPAEDIC SURGERY

## 2024-11-08 PROCEDURE — 14040 TIS TRNFR F/C/C/M/N/A/G/H/F: CPT | Performed by: ORTHOPAEDIC SURGERY

## 2024-11-08 PROCEDURE — 64721 CARPAL TUNNEL SURGERY: CPT | Performed by: ORTHOPAEDIC SURGERY

## 2024-11-08 PROCEDURE — 3700000002 HC GENERAL ANESTHESIA TIME - EACH INCREMENTAL 1 MINUTE: Performed by: ORTHOPAEDIC SURGERY

## 2024-11-08 RX ORDER — SODIUM CHLORIDE, SODIUM LACTATE, POTASSIUM CHLORIDE, CALCIUM CHLORIDE 600; 310; 30; 20 MG/100ML; MG/100ML; MG/100ML; MG/100ML
100 INJECTION, SOLUTION INTRAVENOUS CONTINUOUS
Status: DISCONTINUED | OUTPATIENT
Start: 2024-11-08 | End: 2024-11-08 | Stop reason: HOSPADM

## 2024-11-08 RX ORDER — MIDAZOLAM HYDROCHLORIDE 1 MG/ML
INJECTION, SOLUTION INTRAMUSCULAR; INTRAVENOUS AS NEEDED
Status: DISCONTINUED | OUTPATIENT
Start: 2024-11-08 | End: 2024-11-08

## 2024-11-08 RX ORDER — LIDOCAINE HYDROCHLORIDE AND EPINEPHRINE 10; 10 MG/ML; UG/ML
INJECTION, SOLUTION INFILTRATION; PERINEURAL AS NEEDED
Status: DISCONTINUED | OUTPATIENT
Start: 2024-11-08 | End: 2024-11-08 | Stop reason: HOSPADM

## 2024-11-08 RX ORDER — FENTANYL CITRATE 50 UG/ML
INJECTION, SOLUTION INTRAMUSCULAR; INTRAVENOUS AS NEEDED
Status: DISCONTINUED | OUTPATIENT
Start: 2024-11-08 | End: 2024-11-08

## 2024-11-08 RX ORDER — OXYCODONE HYDROCHLORIDE 5 MG/1
5 TABLET ORAL EVERY 6 HOURS PRN
Qty: 12 TABLET | Refills: 0 | Status: SHIPPED | OUTPATIENT
Start: 2024-11-08

## 2024-11-08 RX ORDER — PROPOFOL 10 MG/ML
INJECTION, EMULSION INTRAVENOUS AS NEEDED
Status: DISCONTINUED | OUTPATIENT
Start: 2024-11-08 | End: 2024-11-08

## 2024-11-08 RX ORDER — LIDOCAINE HYDROCHLORIDE 10 MG/ML
0.1 INJECTION, SOLUTION EPIDURAL; INFILTRATION; INTRACAUDAL; PERINEURAL ONCE
Status: DISCONTINUED | OUTPATIENT
Start: 2024-11-08 | End: 2024-11-08 | Stop reason: HOSPADM

## 2024-11-08 RX ORDER — ONDANSETRON HYDROCHLORIDE 2 MG/ML
INJECTION, SOLUTION INTRAVENOUS AS NEEDED
Status: DISCONTINUED | OUTPATIENT
Start: 2024-11-08 | End: 2024-11-08

## 2024-11-08 RX ORDER — METOCLOPRAMIDE HYDROCHLORIDE 5 MG/ML
10 INJECTION INTRAMUSCULAR; INTRAVENOUS ONCE AS NEEDED
Status: DISCONTINUED | OUTPATIENT
Start: 2024-11-08 | End: 2024-11-08 | Stop reason: HOSPADM

## 2024-11-08 RX ORDER — OXYCODONE AND ACETAMINOPHEN 5; 325 MG/1; MG/1
1 TABLET ORAL EVERY 4 HOURS PRN
Status: DISCONTINUED | OUTPATIENT
Start: 2024-11-08 | End: 2024-11-08 | Stop reason: HOSPADM

## 2024-11-08 RX ORDER — KETOROLAC TROMETHAMINE 30 MG/ML
INJECTION, SOLUTION INTRAMUSCULAR; INTRAVENOUS AS NEEDED
Status: DISCONTINUED | OUTPATIENT
Start: 2024-11-08 | End: 2024-11-08

## 2024-11-08 RX ORDER — CEFAZOLIN 1 G/1
INJECTION, POWDER, FOR SOLUTION INTRAVENOUS AS NEEDED
Status: DISCONTINUED | OUTPATIENT
Start: 2024-11-08 | End: 2024-11-08

## 2024-11-08 RX ORDER — LIDOCAINE HYDROCHLORIDE 20 MG/ML
INJECTION, SOLUTION INFILTRATION; PERINEURAL AS NEEDED
Status: DISCONTINUED | OUTPATIENT
Start: 2024-11-08 | End: 2024-11-08

## 2024-11-08 RX ORDER — MIDAZOLAM HYDROCHLORIDE 1 MG/ML
1 INJECTION, SOLUTION INTRAMUSCULAR; INTRAVENOUS ONCE AS NEEDED
Status: DISCONTINUED | OUTPATIENT
Start: 2024-11-08 | End: 2024-11-08 | Stop reason: HOSPADM

## 2024-11-08 RX ORDER — SODIUM CHLORIDE, SODIUM LACTATE, POTASSIUM CHLORIDE, CALCIUM CHLORIDE 600; 310; 30; 20 MG/100ML; MG/100ML; MG/100ML; MG/100ML
INJECTION, SOLUTION INTRAVENOUS CONTINUOUS PRN
Status: DISCONTINUED | OUTPATIENT
Start: 2024-11-08 | End: 2024-11-08

## 2024-11-08 ASSESSMENT — COLUMBIA-SUICIDE SEVERITY RATING SCALE - C-SSRS
2. HAVE YOU ACTUALLY HAD ANY THOUGHTS OF KILLING YOURSELF?: NO
1. IN THE PAST MONTH, HAVE YOU WISHED YOU WERE DEAD OR WISHED YOU COULD GO TO SLEEP AND NOT WAKE UP?: NO
6. HAVE YOU EVER DONE ANYTHING, STARTED TO DO ANYTHING, OR PREPARED TO DO ANYTHING TO END YOUR LIFE?: NO

## 2024-11-08 ASSESSMENT — PAIN SCALES - GENERAL: PAIN_LEVEL: 0

## 2024-11-08 NOTE — DISCHARGE INSTRUCTIONS
Dr. Gonzalez Post-Operative Instructions  Hand/Wrist/Elbow    Activity:  Rest on the day of surgery.  Gradually resume your regular diet, beginning with clear liquids and progressing as you feel ready.  No driving if you had anesthesia.    Anesthesia:  You may feel dizzy, sleepy or lightheaded for up to 24 hours after surgery.  If you had general anesthesia you may have a sore throat for 1-2 days.  If you had a nerve block wear a sling until nerve function returns for your safety.  Nerve block may last 18-36 hours.    Post-Operative Medications:  You may resume your regular medications (including blood thinners if you stopped them).  You have been given a prescription for pain medication as needed.  You may also take over-the-counter anti-inflammatories and/or Tylenol for pain relief.  If you are taking the prescribed pain medication you must limit additional Tylenol (acetaminophen) intake to avoid overdose.    Dressings:  Keep your dressings clean and dry.  You may cover with a plastic bag or cast cover and seal bag to your skin above the bandage for showering.  If your dressing becomes wet or significantly bloodstained call the office at [phone].  Okay to remove outer dressings after 2-3 days and shower/wash hands.  Do not soak wound (I.e. no swimming pool, hot tub or dishes).    Post-Operative Care:  Keep the surgical site elevated above the level of your heart to limit swelling.  If your fingers are not included in the dressing you are encouraged to move your fingers regularly (full fist and full extension).  Regularly ice the surgical site.  You may also apply ice pack above the level of the dressing and this will cool the blood as it travels towards the surgical site.    Call Surgeon/Office at any time for:           Office Number: [phone]  Excessive bleeding  Loss of feeling (The local numbing medicine from surgery typically lasts 8-12 hours.  Nerve blocks can last 18-36 hours.)  Tight  dressing: Make sure that you are elevating the operative site appropriately.  If no relief then call the office.                                                                                                        Circulation issues:  If fingers change to white or blue  Concerns/Problems with your surgery

## 2024-11-08 NOTE — OP NOTE
Pre-Operative Diagnosis: Recurrent right carpal tunnel syndrome  Post-Operative Diagnosis: same  Procedure: Revision right open carpal tunnel release with hypothenar fat flap  Surgeon: Carlos  Assistant: None  Anesthesia: General  Complications: none  Estimated blood loss: Minimal  Specimen: None  Implants:  Nothing was implanted during the procedure  Findings: See below  Disposition: Good/PACU      Indications: Patient with recurrent right carpal tunnel symptoms that have failed response to conservative treatment.  We discussed risks and benefits of continued nonoperative versus operative treatment options and after thoroughly reviewing patient wished proceed with surgical intervention, as indicated.  Expected operative and postoperative courses reviewed and questions addressed.  She understands potential for continued symptoms and that the primary goal of surgery is to hopefully prevent further worsening.    Operative course: Patient was greeted in the preoperative holding area and the operative site was marked with indelible marker.  She was brought back to the operating room suite where general anesthetic was induced by the anesthesia team.  Right upper extremities prepped and draped in standard sterile fashion timeout procedure was performed as per standard protocol.  Esmarch was used to exsanguinate right upper extremity and upper forearm tourniquet was inflated.  Longitudinal incision was made in line with the radial border the ring finger.  Spreading was carried down through the subcutaneous tissues and then the ulnar neurovascular bundle was identified and carefully traced distally.  The ulnar neurovascular bundle and vascular arch were protected ulnarly/distally while the contents of the carpal tunnel were protected radially.  The transverse carpal ligament was then sharply released in a distal to proximal direction under direct visualization.  A tendon most consistent with the palmaris longus was  adherent to an ulnar muscle.  This was carefully dissected and freed while protecting the underlying median nerve so as to prevent any additional compression on the underlying median nerve.  The median nerve was then freed from the undersurface of the transverse carpal ligament which was quite adherent with scar tissue.  Median nerve was inspected and confirmed to remain fully intact.  A hypothenar fat flap was then developed, taking care to protect the ulnar vascular attachments to the fat.  This fat was then able to be rotated over the median nerve underneath the transverse carpal ligament.  Wound was irrigated and the fat flap was secured near its base using 4-0 Monocryl to the overlying transverse carpal ligament.  Care was taken to continue to protect the vessels to the hypothenar fat flap.  Skin was reapproximated with 4-0 Monocryl in a buried interrupted fashion followed by Dermabond on the skin.  Lidocaine with epinephrine was infiltrated about the surgical incision for postoperative analgesic relief.  Dry sterile dressings were applied and patient was taken to the recovery for further care.    She will follow-up in 2 weeks for wound check.

## 2024-11-08 NOTE — ANESTHESIA POSTPROCEDURE EVALUATION
Patient: Lexi Salazar    Procedure Summary       Date: 11/08/24 Room / Location: [facility] 01 / Virtual [facility]    Anesthesia Start: 0838 Anesthesia Stop: 0926    Procedure: Right Revision Carpal Tunnel Release with hypothenar fat (Right: Hand) Diagnosis:       Carpal tunnel syndrome on right      (Carpal tunnel syndrome on right [G56.01])    Surgeons: Bismark Gonzalez MD Responsible Provider: Chikis Brian MD    Anesthesia Type: general ASA Status: 2            Anesthesia Type: general    Vitals Value Taken Time   BP  11/08/24 0934   Temp  11/08/24 0934   Pulse  11/08/24 0934   Resp  11/08/24 0934   SpO2  11/08/24 0934     Vitals: stable    Anesthesia Post Evaluation    Patient location during evaluation: PACU  Patient participation: complete - patient participated  Level of consciousness: awake  Pain score: 0  Pain management: adequate  Airway patency: patent  Cardiovascular status: acceptable  Respiratory status: acceptable  Hydration status: acceptable  Postoperative Nausea and Vomiting: none        There were no known notable events for this encounter.

## 2024-11-08 NOTE — ANESTHESIA PROCEDURE NOTES
Airway  Date/Time: 11/8/2024 8:42 AM  Urgency: elective    Airway not difficult    Staffing  Performed: ROMAN   Authorized by: Chikis Brian MD    Performed by: ROMAN Lanza  Patient location during procedure: OR    Indications and Patient Condition  Indications for airway management: anesthesia  Spontaneous Ventilation: absent  Sedation level: deep  Preoxygenated: yes  Patient position: sniffing  MILS maintained throughout  Mask difficulty assessment: 0 - not attempted    Final Airway Details  Final airway type: supraglottic airway      Successful airway: classic  Size 4     Number of attempts at approach: 1

## 2024-11-08 NOTE — ANESTHESIA PREPROCEDURE EVALUATION
Patient: Lexi Salazar    Procedure Information       Date/Time: 11/08/24 0830    Procedure: Right Revision Carpal Tunnel Release with hypothenar fat (Right: Hand)    [place] [facility] [place] [facility] [place] [facility] [place] [facility]    Surgeons: Bismark Gonzalez MD            Relevant Problems   Cardiac   (+) Hyperlipidemia   (+) Other supraventricular tachycardia (S/p successful ablation in 2016)      Neuro   (+) Anxiety   (+) Carpal tunnel syndrome on right      Endocrine   (+) Obesity      Musculoskeletal   (+) Carpal tunnel syndrome on right       Clinical information reviewed:   Tobacco  Allergies  Meds   Med Hx  Surg Hx   Fam Hx  Soc Hx        NPO Detail:  NPO/Void Status  Date of Last Liquid: 11/07/24  Time of Last Liquid: 2200  Date of Last Solid: 11/07/24  Time of Last Solid: 2200         Physical Exam    Airway  Mallampati: II  TM distance: >3 FB  Neck ROM: full     Cardiovascular - normal exam     Dental - normal exam     Pulmonary - normal exam     Abdominal - normal exam  (+) obese             Anesthesia Plan    History of general anesthesia?: yes  History of complications of general anesthesia?: no    ASA 2     general     intravenous induction   Anesthetic plan and risks discussed with patient.    Plan discussed with CAA.

## 2024-11-11 ASSESSMENT — PAIN SCALES - GENERAL: PAINLEVEL_OUTOF10: 0 - NO PAIN

## 2024-11-21 ENCOUNTER — OFFICE VISIT (OUTPATIENT)
Dept: ORTHOPEDIC SURGERY | Facility: CLINIC | Age: 54
End: 2024-11-21
Payer: OTHER GOVERNMENT

## 2024-11-21 DIAGNOSIS — G56.01 CARPAL TUNNEL SYNDROME ON RIGHT: Primary | ICD-10-CM

## 2024-11-21 PROCEDURE — 1036F TOBACCO NON-USER: CPT | Performed by: ORTHOPAEDIC SURGERY

## 2024-11-21 PROCEDURE — 99211 OFF/OP EST MAY X REQ PHY/QHP: CPT | Performed by: ORTHOPAEDIC SURGERY

## 2024-11-21 ASSESSMENT — ENCOUNTER SYMPTOMS
OCCASIONAL FEELINGS OF UNSTEADINESS: 0
DEPRESSION: 0
LOSS OF SENSATION IN FEET: 0

## 2024-11-21 ASSESSMENT — PAIN - FUNCTIONAL ASSESSMENT: PAIN_FUNCTIONAL_ASSESSMENT: 0-10

## 2024-11-21 ASSESSMENT — PAIN SCALES - GENERAL: PAINLEVEL_OUTOF10: 0 - NO PAIN

## 2024-11-21 ASSESSMENT — PAIN DESCRIPTION - DESCRIPTORS: DESCRIPTORS: ITCHING

## 2024-11-22 NOTE — PROGRESS NOTES
History of Present Illness  Chief Complaint   Patient presents with    Right Wrist - Post-op     11/8/24 R CTR        Right hand symptoms significantly improved compared to preoperatively.  Some scar tissue soreness that has been significantly improving.     Examination  Right hand:  Incision is well healing. No signs of infection.  Sensation grossly intact distally.  Capillary refill less than 2 seconds.  Thumb abduction intact     Assessment:  Patient status post right thumb revision carpal tunnel release     Plan  Patient will begin scar tissue massage.  Continue mobilization and progression of activities.  We discussed expected recovery course as well as likely peak reaction at 4 to 6 weeks postoperatively.  Follow-up as needed.  Questions addressed.

## 2024-12-06 ENCOUNTER — HOSPITAL ENCOUNTER (OUTPATIENT)
Dept: RADIOLOGY | Facility: CLINIC | Age: 54
Discharge: HOME | End: 2024-12-06
Payer: OTHER GOVERNMENT

## 2024-12-06 ENCOUNTER — OFFICE VISIT (OUTPATIENT)
Dept: ORTHOPEDIC SURGERY | Facility: CLINIC | Age: 54
End: 2024-12-06
Payer: OTHER GOVERNMENT

## 2024-12-06 DIAGNOSIS — Z98.890 HISTORY OF CARPAL TUNNEL RELEASE: ICD-10-CM
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
## 2024-12-06 DIAGNOSIS — M79.641 RIGHT HAND PAIN: ICD-10-CM

## 2024-12-06 PROCEDURE — 99211 OFF/OP EST MAY X REQ PHY/QHP: CPT | Mod: 25

## 2024-12-06 PROCEDURE — 1036F TOBACCO NON-USER: CPT

## 2024-12-06 PROCEDURE — 73130 X-RAY EXAM OF HAND: CPT | Mod: RT

## 2024-12-06 ASSESSMENT — ENCOUNTER SYMPTOMS
LOSS OF SENSATION IN FEET: 0
OCCASIONAL FEELINGS OF UNSTEADINESS: 0
DEPRESSION: 0

## 2024-12-06 ASSESSMENT — PAIN - FUNCTIONAL ASSESSMENT: PAIN_FUNCTIONAL_ASSESSMENT: 0-10

## 2024-12-06 ASSESSMENT — PAIN SCALES - GENERAL: PAINLEVEL_OUTOF10: 1

## 2024-12-06 ASSESSMENT — PAIN DESCRIPTION - DESCRIPTORS: DESCRIPTORS: ACHING

## 2025-04-07 DIAGNOSIS — F41.9 ANXIETY: ICD-10-CM

## 2025-04-07 RX ORDER — SERTRALINE HYDROCHLORIDE 50 MG/1
50 TABLET, FILM COATED ORAL DAILY
Qty: 90 TABLET | Refills: 3 | Status: SHIPPED | OUTPATIENT
Start: 2025-04-07

## 2025-05-22 DIAGNOSIS — E78.49 OTHER HYPERLIPIDEMIA: ICD-10-CM

## 2025-05-22 RX ORDER — ATORVASTATIN CALCIUM 40 MG/1
40 TABLET, FILM COATED ORAL DAILY
Qty: 90 TABLET | Refills: 3 | Status: SHIPPED | OUTPATIENT
Start: 2025-05-22

## 2025-06-16 ENCOUNTER — OFFICE VISIT (OUTPATIENT)
Dept: PRIMARY CARE | Facility: CLINIC | Age: 55
End: 2025-06-16
Payer: OTHER GOVERNMENT

## 2025-06-16 VITALS
DIASTOLIC BLOOD PRESSURE: 84 MMHG | RESPIRATION RATE: 18 BRPM | WEIGHT: 211.6 LBS | BODY MASS INDEX: 39.95 KG/M2 | TEMPERATURE: 97.9 F | SYSTOLIC BLOOD PRESSURE: 136 MMHG | OXYGEN SATURATION: 96 % | HEIGHT: 61 IN | HEART RATE: 70 BPM

## 2025-06-16 DIAGNOSIS — E66.812 CLASS 2 OBESITY WITHOUT SERIOUS COMORBIDITY WITH BODY MASS INDEX (BMI) OF 39.0 TO 39.9 IN ADULT, UNSPECIFIED OBESITY TYPE: ICD-10-CM

## 2025-06-16 DIAGNOSIS — Z13.29 SCREENING FOR THYROID DISORDER: ICD-10-CM

## 2025-06-16 DIAGNOSIS — F41.9 ANXIETY: ICD-10-CM

## 2025-06-16 DIAGNOSIS — N95.1 FLUSHING, MENOPAUSAL: ICD-10-CM

## 2025-06-16 DIAGNOSIS — E78.2 MIXED HYPERLIPIDEMIA: ICD-10-CM

## 2025-06-16 DIAGNOSIS — Z12.83 SCREENING FOR SKIN CANCER: ICD-10-CM

## 2025-06-16 DIAGNOSIS — Z00.00 ROUTINE MEDICAL EXAM: Primary | ICD-10-CM

## 2025-06-16 DIAGNOSIS — E78.49 OTHER HYPERLIPIDEMIA: ICD-10-CM

## 2025-06-16 DIAGNOSIS — Z12.83 SCREENING EXAM FOR SKIN CANCER: ICD-10-CM

## 2025-06-16 DIAGNOSIS — Z12.11 SCREEN FOR COLON CANCER: ICD-10-CM

## 2025-06-16 DIAGNOSIS — Z12.31 ENCOUNTER FOR SCREENING MAMMOGRAM FOR MALIGNANT NEOPLASM OF BREAST: ICD-10-CM

## 2025-06-16 DIAGNOSIS — E55.9 VITAMIN D DEFICIENCY: ICD-10-CM

## 2025-06-16 DIAGNOSIS — Z13.1 ENCOUNTER FOR SCREENING FOR DIABETES MELLITUS: ICD-10-CM

## 2025-06-16 DIAGNOSIS — E78.5 HYPERLIPIDEMIA, UNSPECIFIED HYPERLIPIDEMIA TYPE: ICD-10-CM

## 2025-06-16 DIAGNOSIS — Z00.00 WELL ADULT EXAM: ICD-10-CM

## 2025-06-16 DIAGNOSIS — Z11.59 ENCOUNTER FOR HEPATITIS C SCREENING TEST FOR LOW RISK PATIENT: ICD-10-CM

## 2025-06-16 PROCEDURE — 99396 PREV VISIT EST AGE 40-64: CPT | Performed by: NURSE PRACTITIONER

## 2025-06-16 PROCEDURE — 1036F TOBACCO NON-USER: CPT | Performed by: NURSE PRACTITIONER

## 2025-06-16 PROCEDURE — 3008F BODY MASS INDEX DOCD: CPT | Performed by: NURSE PRACTITIONER

## 2025-06-16 RX ORDER — ATORVASTATIN CALCIUM 40 MG/1
40 TABLET, FILM COATED ORAL DAILY
Qty: 90 TABLET | Refills: 3 | Status: SHIPPED | OUTPATIENT
Start: 2025-06-16

## 2025-06-16 ASSESSMENT — PROMIS GLOBAL HEALTH SCALE
RATE_AVERAGE_PAIN: 0
RATE_QUALITY_OF_LIFE: VERY GOOD
RATE_GENERAL_HEALTH: GOOD
CARRYOUT_PHYSICAL_ACTIVITIES: COMPLETELY
RATE_AVERAGE_FATIGUE: MILD
RATE_PHYSICAL_HEALTH: GOOD
EMOTIONAL_PROBLEMS: SOMETIMES
RATE_SOCIAL_SATISFACTION: VERY GOOD
RATE_MENTAL_HEALTH: VERY GOOD
CARRYOUT_SOCIAL_ACTIVITIES: VERY GOOD

## 2025-06-16 ASSESSMENT — LIFESTYLE VARIABLES
AUDIT-C TOTAL SCORE: 0
SKIP TO QUESTIONS 9-10: 1
HOW OFTEN DO YOU HAVE SIX OR MORE DRINKS ON ONE OCCASION: NEVER
HOW OFTEN DO YOU HAVE A DRINK CONTAINING ALCOHOL: NEVER
HOW MANY STANDARD DRINKS CONTAINING ALCOHOL DO YOU HAVE ON A TYPICAL DAY: PATIENT DOES NOT DRINK

## 2025-06-16 ASSESSMENT — PAIN SCALES - GENERAL: PAINLEVEL_OUTOF10: 0-NO PAIN

## 2025-06-16 ASSESSMENT — PATIENT HEALTH QUESTIONNAIRE - PHQ9
2. FEELING DOWN, DEPRESSED OR HOPELESS: NOT AT ALL
1. LITTLE INTEREST OR PLEASURE IN DOING THINGS: NOT AT ALL
SUM OF ALL RESPONSES TO PHQ9 QUESTIONS 1 AND 2: 0

## 2025-06-16 NOTE — PROGRESS NOTES
"Subjective   Patient ID: Lexi Salazar is a 55 y.o. female who presents for Annual Exam (PT IS HERE FOR ANNUAL EXAM. PT SEES OBGYN, HAS UPCOMING APPOINTMENT. ).    HERE FOR A WELLVISIT  SEES GYN DR Griffith upcoming appoitment, concerned about eight, keeps gaining, doing well busy activem       Review of Systems   Endocrine:        HOT FLASHES  NIGHT SWEATS       Objective   /84   Pulse 70   Temp 36.6 °C (97.9 °F)   Resp 18   Ht 1.549 m (5' 1\")   Wt 96 kg (211 lb 9.6 oz)   LMP  (LMP Unknown)   SpO2 96%   BMI 39.98 kg/m²     Physical Exam  Vitals and nursing note reviewed.   Constitutional:       General: She is not in acute distress.  HENT:      Right Ear: Tympanic membrane and ear canal normal.      Left Ear: Tympanic membrane and ear canal normal.      Nose: Nose normal. No rhinorrhea.      Mouth/Throat:      Pharynx: Oropharynx is clear. No oropharyngeal exudate or posterior oropharyngeal erythema.      Comments: Dentition wnl  Eyes:      Extraocular Movements: Extraocular movements intact.      Conjunctiva/sclera: Conjunctivae normal.      Pupils: Pupils are equal, round, and reactive to light.   Neck:      Vascular: No carotid bruit.   Cardiovascular:      Rate and Rhythm: Normal rate and regular rhythm.      Heart sounds: Normal heart sounds. No murmur heard.  Pulmonary:      Breath sounds: Normal breath sounds. No wheezing or rhonchi.   Abdominal:      General: Bowel sounds are normal. There is no distension.      Palpations: Abdomen is soft. There is no mass.      Tenderness: There is no abdominal tenderness. There is no guarding or rebound.      Hernia: No hernia is present.   Genitourinary:     Comments: Breast exam nl  Musculoskeletal:         General: No swelling or tenderness. Normal range of motion.      Cervical back: Normal range of motion and neck supple.   Lymphadenopathy:      Cervical: No cervical adenopathy.   Skin:     General: Skin is warm.      Findings: No rash. "   Neurological:      General: No focal deficit present.      Mental Status: She is alert and oriented to person, place, and time.   Psychiatric:         Behavior: Behavior normal.       Assessment/Plan   Problem List Items Addressed This Visit           ICD-10-CM    Anxiety F41.9    Relevant Orders    CBC and Auto Differential    Comprehensive Metabolic Panel    Lipid Panel    TSH with reflex to Free T4 if abnormal    Hyperlipidemia E78.5    Relevant Medications    atorvastatin (Lipitor) 40 mg tablet    Other Relevant Orders    CBC and Auto Differential    Comprehensive Metabolic Panel    Lipid Panel    TSH with reflex to Free T4 if abnormal    Obesity E66.9    Vitamin D deficiency E55.9    Relevant Orders    Vitamin D 25-Hydroxy,Total (for eval of Vitamin D levels)     Other Visit Diagnoses         Codes      Routine medical exam    -  Primary Z00.00    Relevant Orders    CBC and Auto Differential    Comprehensive Metabolic Panel    Lipid Panel    TSH with reflex to Free T4 if abnormal      Well adult exam     Z00.00      Screening for thyroid disorder     Z13.29      Encounter for hepatitis C screening test for low risk patient     Z11.59      Screening for skin cancer     Z12.83      Screening exam for skin cancer     Z12.83      Flushing, menopausal     N95.1    Relevant Orders    Follicle Stimulating Hormone    Referral to Dermatology      Encounter for screening mammogram for malignant neoplasm of breast     Z12.31    Relevant Orders    BI mammo bilateral screening tomosynthesis      Encounter for screening for diabetes mellitus     Z13.1    Relevant Orders    Hemoglobin A1C      Screen for colon cancer     Z12.11    Relevant Orders    Referral to Gastroenterology          Will quirino with labs  see gyn , discussed weight , menapusal issues

## 2025-06-18 LAB
25(OH)D3+25(OH)D2 SERPL-MCNC: 101 NG/ML (ref 30–100)
ALBUMIN SERPL-MCNC: 4.2 G/DL (ref 3.6–5.1)
ALP SERPL-CCNC: 91 U/L (ref 37–153)
ALT SERPL-CCNC: 11 U/L (ref 6–29)
ANION GAP SERPL CALCULATED.4IONS-SCNC: 11 MMOL/L (CALC) (ref 7–17)
AST SERPL-CCNC: 15 U/L (ref 10–35)
BASOPHILS # BLD AUTO: 50 CELLS/UL (ref 0–200)
BASOPHILS NFR BLD AUTO: 0.8 %
BILIRUB SERPL-MCNC: 0.5 MG/DL (ref 0.2–1.2)
BUN SERPL-MCNC: 13 MG/DL (ref 7–25)
CALCIUM SERPL-MCNC: 9.1 MG/DL (ref 8.6–10.4)
CHLORIDE SERPL-SCNC: 103 MMOL/L (ref 98–110)
CHOLEST SERPL-MCNC: 156 MG/DL
CHOLEST/HDLC SERPL: 3.2 (CALC)
CO2 SERPL-SCNC: 25 MMOL/L (ref 20–32)
CREAT SERPL-MCNC: 0.84 MG/DL (ref 0.5–1.03)
EGFRCR SERPLBLD CKD-EPI 2021: 82 ML/MIN/1.73M2
EOSINOPHIL # BLD AUTO: 62 CELLS/UL (ref 15–500)
EOSINOPHIL NFR BLD AUTO: 1 %
ERYTHROCYTE [DISTWIDTH] IN BLOOD BY AUTOMATED COUNT: 12.5 % (ref 11–15)
EST. AVERAGE GLUCOSE BLD GHB EST-MCNC: 131 MG/DL
EST. AVERAGE GLUCOSE BLD GHB EST-SCNC: 7.3 MMOL/L
FSH SERPL-ACNC: 7.9 MIU/ML
GLUCOSE SERPL-MCNC: 106 MG/DL (ref 65–99)
HBA1C MFR BLD: 6.2 %
HCT VFR BLD AUTO: 41.6 % (ref 35–45)
HDLC SERPL-MCNC: 49 MG/DL
HGB BLD-MCNC: 13.5 G/DL (ref 11.7–15.5)
LDLC SERPL CALC-MCNC: 86 MG/DL (CALC)
LYMPHOCYTES # BLD AUTO: 1593 CELLS/UL (ref 850–3900)
LYMPHOCYTES NFR BLD AUTO: 25.7 %
MCH RBC QN AUTO: 30 PG (ref 27–33)
MCHC RBC AUTO-ENTMCNC: 32.5 G/DL (ref 32–36)
MCV RBC AUTO: 92.4 FL (ref 80–100)
MONOCYTES # BLD AUTO: 397 CELLS/UL (ref 200–950)
MONOCYTES NFR BLD AUTO: 6.4 %
NEUTROPHILS # BLD AUTO: 4098 CELLS/UL (ref 1500–7800)
NEUTROPHILS NFR BLD AUTO: 66.1 %
NONHDLC SERPL-MCNC: 107 MG/DL (CALC)
PLATELET # BLD AUTO: 272 THOUSAND/UL (ref 140–400)
PMV BLD REES-ECKER: 11 FL (ref 7.5–12.5)
POTASSIUM SERPL-SCNC: 3.9 MMOL/L (ref 3.5–5.3)
PROT SERPL-MCNC: 6.8 G/DL (ref 6.1–8.1)
RBC # BLD AUTO: 4.5 MILLION/UL (ref 3.8–5.1)
SODIUM SERPL-SCNC: 139 MMOL/L (ref 135–146)
TRIGL SERPL-MCNC: 111 MG/DL
TSH SERPL-ACNC: 2.11 MIU/L
WBC # BLD AUTO: 6.2 THOUSAND/UL (ref 3.8–10.8)

## 2025-06-19 ENCOUNTER — HOSPITAL ENCOUNTER (OUTPATIENT)
Dept: RADIOLOGY | Facility: HOSPITAL | Age: 55
Discharge: HOME | End: 2025-06-19
Payer: OTHER GOVERNMENT

## 2025-06-19 DIAGNOSIS — Z12.31 ENCOUNTER FOR SCREENING MAMMOGRAM FOR MALIGNANT NEOPLASM OF BREAST: ICD-10-CM

## 2025-06-19 PROCEDURE — 77067 SCR MAMMO BI INCL CAD: CPT

## 2025-06-19 PROCEDURE — 77067 SCR MAMMO BI INCL CAD: CPT | Performed by: STUDENT IN AN ORGANIZED HEALTH CARE EDUCATION/TRAINING PROGRAM

## 2025-06-19 PROCEDURE — 77063 BREAST TOMOSYNTHESIS BI: CPT | Performed by: STUDENT IN AN ORGANIZED HEALTH CARE EDUCATION/TRAINING PROGRAM

## 2025-08-18 ENCOUNTER — CLINICAL SUPPORT (OUTPATIENT)
Dept: PRIMARY CARE | Facility: CLINIC | Age: 55
End: 2025-08-18
Payer: OTHER GOVERNMENT

## 2025-08-18 ENCOUNTER — APPOINTMENT (OUTPATIENT)
Dept: PRIMARY CARE | Facility: CLINIC | Age: 55
End: 2025-08-18
Payer: OTHER GOVERNMENT

## 2025-08-18 VITALS
HEIGHT: 61 IN | OXYGEN SATURATION: 95 % | SYSTOLIC BLOOD PRESSURE: 124 MMHG | WEIGHT: 208.6 LBS | RESPIRATION RATE: 18 BRPM | BODY MASS INDEX: 39.38 KG/M2 | TEMPERATURE: 97.1 F | DIASTOLIC BLOOD PRESSURE: 82 MMHG | HEART RATE: 79 BPM

## 2025-08-18 DIAGNOSIS — E66.812 CLASS 2 OBESITY WITHOUT SERIOUS COMORBIDITY WITH BODY MASS INDEX (BMI) OF 39.0 TO 39.9 IN ADULT, UNSPECIFIED OBESITY TYPE: ICD-10-CM

## 2025-08-18 DIAGNOSIS — N95.1 FLUSHING, MENOPAUSAL: Primary | ICD-10-CM

## 2025-08-18 DIAGNOSIS — E11.65 TYPE 2 DIABETES MELLITUS WITH HYPERGLYCEMIA, WITHOUT LONG-TERM CURRENT USE OF INSULIN: Primary | ICD-10-CM

## 2025-08-18 DIAGNOSIS — E11.65 TYPE 2 DIABETES MELLITUS WITH HYPERGLYCEMIA, WITHOUT LONG-TERM CURRENT USE OF INSULIN: ICD-10-CM

## 2025-08-18 DIAGNOSIS — I47.10 SVT (SUPRAVENTRICULAR TACHYCARDIA): ICD-10-CM

## 2025-08-18 PROCEDURE — 3079F DIAST BP 80-89 MM HG: CPT | Performed by: NURSE PRACTITIONER

## 2025-08-18 PROCEDURE — 99214 OFFICE O/P EST MOD 30 MIN: CPT | Performed by: NURSE PRACTITIONER

## 2025-08-18 PROCEDURE — 3074F SYST BP LT 130 MM HG: CPT | Performed by: NURSE PRACTITIONER

## 2025-08-18 PROCEDURE — 3008F BODY MASS INDEX DOCD: CPT | Performed by: NURSE PRACTITIONER

## 2025-08-18 RX ORDER — DEXTROSE 4 G
TABLET,CHEWABLE ORAL
Qty: 1 EACH | Refills: 0 | Status: SHIPPED | OUTPATIENT
Start: 2025-08-18

## 2025-08-18 RX ORDER — BLOOD SUGAR DIAGNOSTIC
STRIP MISCELLANEOUS
Qty: 50 EACH | Refills: 5 | Status: SHIPPED | OUTPATIENT
Start: 2025-08-18

## 2025-08-18 RX ORDER — ESTRADIOL 1 MG/1
1 TABLET ORAL DAILY
Qty: 90 TABLET | Refills: 3 | Status: SHIPPED | OUTPATIENT
Start: 2025-08-18

## 2025-08-18 RX ORDER — BLOOD-GLUCOSE CONTROL, NORMAL
EACH MISCELLANEOUS
Qty: 50 EACH | Refills: 5 | Status: SHIPPED | OUTPATIENT
Start: 2025-08-18

## 2025-08-18 RX ORDER — TIRZEPATIDE 5 MG/.5ML
5 INJECTION, SOLUTION SUBCUTANEOUS
Qty: 2 ML | Refills: 2 | Status: SHIPPED | OUTPATIENT
Start: 2025-08-18 | End: 2025-08-20 | Stop reason: SDUPTHER

## 2025-08-18 RX ORDER — MEDROXYPROGESTERONE ACETATE 5 MG/1
5 TABLET ORAL EVERY 24 HOURS
Qty: 90 TABLET | Refills: 2 | Status: SHIPPED | OUTPATIENT
Start: 2025-08-18

## 2025-08-18 RX ORDER — TIRZEPATIDE 2.5 MG/.5ML
2.5 INJECTION, SOLUTION SUBCUTANEOUS
Qty: 2 ML | Refills: 0 | Status: SHIPPED | OUTPATIENT
Start: 2025-08-18

## 2025-08-18 ASSESSMENT — LIFESTYLE VARIABLES
HOW OFTEN DO YOU HAVE SIX OR MORE DRINKS ON ONE OCCASION: NEVER
SKIP TO QUESTIONS 9-10: 1
HOW MANY STANDARD DRINKS CONTAINING ALCOHOL DO YOU HAVE ON A TYPICAL DAY: PATIENT DOES NOT DRINK
AUDIT-C TOTAL SCORE: 0
HOW OFTEN DO YOU HAVE A DRINK CONTAINING ALCOHOL: NEVER

## 2025-08-18 ASSESSMENT — PAIN SCALES - GENERAL: PAINLEVEL_OUTOF10: 0-NO PAIN

## 2025-08-20 ENCOUNTER — TELEPHONE (OUTPATIENT)
Dept: PRIMARY CARE | Facility: CLINIC | Age: 55
End: 2025-08-20
Payer: OTHER GOVERNMENT

## 2025-08-20 DIAGNOSIS — E11.65 TYPE 2 DIABETES MELLITUS WITH HYPERGLYCEMIA, WITHOUT LONG-TERM CURRENT USE OF INSULIN: ICD-10-CM

## 2025-08-20 RX ORDER — TIRZEPATIDE 5 MG/.5ML
5 INJECTION, SOLUTION SUBCUTANEOUS
Qty: 2 ML | Refills: 2 | Status: SHIPPED | OUTPATIENT
Start: 2025-08-20

## 2025-08-21 RX ORDER — TIRZEPATIDE 2.5 MG/.5ML
2.5 INJECTION, SOLUTION SUBCUTANEOUS
Qty: 2 ML | Refills: 0 | Status: CANCELLED | OUTPATIENT
Start: 2025-08-21

## 2025-08-21 RX ORDER — TIRZEPATIDE 5 MG/.5ML
5 INJECTION, SOLUTION SUBCUTANEOUS
Qty: 2 ML | Refills: 2 | Status: CANCELLED | OUTPATIENT
Start: 2025-08-21

## 2025-08-21 RX ORDER — DULAGLUTIDE 0.75 MG/.5ML
0.75 INJECTION, SOLUTION SUBCUTANEOUS
Qty: 2 ML | Refills: 2 | Status: SHIPPED | OUTPATIENT
Start: 2025-08-21

## 2025-09-02 ENCOUNTER — TELEPHONE (OUTPATIENT)
Dept: PRIMARY CARE | Facility: CLINIC | Age: 55
End: 2025-09-02
Payer: OTHER GOVERNMENT

## 2025-09-02 DIAGNOSIS — E11.65 TYPE 2 DIABETES MELLITUS WITH HYPERGLYCEMIA, WITHOUT LONG-TERM CURRENT USE OF INSULIN: Primary | ICD-10-CM

## 2025-09-23 ENCOUNTER — APPOINTMENT (OUTPATIENT)
Dept: OBSTETRICS AND GYNECOLOGY | Facility: CLINIC | Age: 55
End: 2025-09-23
Payer: OTHER GOVERNMENT

## (undated) DEVICE — ADHESIVE, SKIN, DERMABOND ADVANCED, 15CM, PEN-STYLE

## (undated) DEVICE — SUTURE, MONOCRYL, 4-0, 18 IN, PS2, UNDYED

## (undated) DEVICE — SOLUTION, IRRIGATION, SODIUM CHLORIDE 0.9%, 1000 ML, POUR BOTTLE

## (undated) DEVICE — Device

## (undated) DEVICE — CUFF, TOURNIQUET, 18 X 4, SNGL PORT/SNGL BLADDER, DISP, LF